# Patient Record
Sex: FEMALE | Race: BLACK OR AFRICAN AMERICAN | NOT HISPANIC OR LATINO | Employment: FULL TIME | URBAN - METROPOLITAN AREA
[De-identification: names, ages, dates, MRNs, and addresses within clinical notes are randomized per-mention and may not be internally consistent; named-entity substitution may affect disease eponyms.]

---

## 2018-09-10 ENCOUNTER — HOSPITAL ENCOUNTER (EMERGENCY)
Facility: HOSPITAL | Age: 63
Discharge: HOME | End: 2018-09-10
Attending: EMERGENCY MEDICINE
Payer: COMMERCIAL

## 2018-09-10 VITALS
WEIGHT: 185 LBS | RESPIRATION RATE: 18 BRPM | SYSTOLIC BLOOD PRESSURE: 167 MMHG | OXYGEN SATURATION: 98 % | HEART RATE: 82 BPM | HEIGHT: 66 IN | DIASTOLIC BLOOD PRESSURE: 101 MMHG | TEMPERATURE: 99 F | BODY MASS INDEX: 29.73 KG/M2

## 2018-09-10 DIAGNOSIS — R10.10 UPPER ABDOMINAL PAIN: ICD-10-CM

## 2018-09-10 DIAGNOSIS — K21.9 GASTROESOPHAGEAL REFLUX DISEASE WITHOUT ESOPHAGITIS: Primary | ICD-10-CM

## 2018-09-10 LAB
ALBUMIN SERPL BCP-MCNC: 4 G/DL
ALP SERPL-CCNC: 155 U/L
ALT SERPL W/O P-5'-P-CCNC: 13 U/L
ANION GAP SERPL CALC-SCNC: 12 MMOL/L
AST SERPL-CCNC: 14 U/L
BASOPHILS # BLD AUTO: 0.03 K/UL
BASOPHILS NFR BLD: 0.2 %
BILIRUB SERPL-MCNC: 0.3 MG/DL
BILIRUB UR QL STRIP: NEGATIVE
BUN SERPL-MCNC: 12 MG/DL
CALCIUM SERPL-MCNC: 9.9 MG/DL
CHLORIDE SERPL-SCNC: 103 MMOL/L
CLARITY UR: CLEAR
CO2 SERPL-SCNC: 24 MMOL/L
COLOR UR: YELLOW
CREAT SERPL-MCNC: 0.8 MG/DL
DIFFERENTIAL METHOD: ABNORMAL
EOSINOPHIL # BLD AUTO: 0.2 K/UL
EOSINOPHIL NFR BLD: 1.5 %
ERYTHROCYTE [DISTWIDTH] IN BLOOD BY AUTOMATED COUNT: 15.5 %
EST. GFR  (AFRICAN AMERICAN): >60 ML/MIN/1.73 M^2
EST. GFR  (NON AFRICAN AMERICAN): >60 ML/MIN/1.73 M^2
GLUCOSE SERPL-MCNC: 80 MG/DL
GLUCOSE UR QL STRIP: NEGATIVE
HCT VFR BLD AUTO: 39.3 %
HGB BLD-MCNC: 12.7 G/DL
HGB UR QL STRIP: NEGATIVE
KETONES UR QL STRIP: NEGATIVE
LEUKOCYTE ESTERASE UR QL STRIP: ABNORMAL
LIPASE SERPL-CCNC: 8 U/L
LYMPHOCYTES # BLD AUTO: 3.1 K/UL
LYMPHOCYTES NFR BLD: 24.1 %
MCH RBC QN AUTO: 24.9 PG
MCHC RBC AUTO-ENTMCNC: 32.3 G/DL
MCV RBC AUTO: 77 FL
MICROSCOPIC COMMENT: ABNORMAL
MONOCYTES # BLD AUTO: 0.7 K/UL
MONOCYTES NFR BLD: 5.5 %
NEUTROPHILS # BLD AUTO: 8.9 K/UL
NEUTROPHILS NFR BLD: 68.7 %
NITRITE UR QL STRIP: NEGATIVE
PH UR STRIP: 5 [PH] (ref 5–8)
PLATELET # BLD AUTO: 467 K/UL
PMV BLD AUTO: 10.6 FL
POTASSIUM SERPL-SCNC: 3.8 MMOL/L
PROT SERPL-MCNC: 7.9 G/DL
PROT UR QL STRIP: NEGATIVE
RBC # BLD AUTO: 5.1 M/UL
RBC #/AREA URNS HPF: 0 /HPF (ref 0–4)
SODIUM SERPL-SCNC: 139 MMOL/L
SP GR UR STRIP: 1.02 (ref 1–1.03)
SQUAMOUS #/AREA URNS HPF: 25 /HPF
URN SPEC COLLECT METH UR: ABNORMAL
UROBILINOGEN UR STRIP-ACNC: NEGATIVE EU/DL
WBC # BLD AUTO: 12.96 K/UL
WBC #/AREA URNS HPF: 2 /HPF (ref 0–5)
YEAST URNS QL MICRO: ABNORMAL

## 2018-09-10 PROCEDURE — S0028 INJECTION, FAMOTIDINE, 20 MG: HCPCS | Performed by: PHYSICIAN ASSISTANT

## 2018-09-10 PROCEDURE — 25000003 PHARM REV CODE 250: Performed by: PHYSICIAN ASSISTANT

## 2018-09-10 PROCEDURE — 96374 THER/PROPH/DIAG INJ IV PUSH: CPT

## 2018-09-10 PROCEDURE — 81000 URINALYSIS NONAUTO W/SCOPE: CPT

## 2018-09-10 PROCEDURE — 80053 COMPREHEN METABOLIC PANEL: CPT

## 2018-09-10 PROCEDURE — 83690 ASSAY OF LIPASE: CPT

## 2018-09-10 PROCEDURE — 85025 COMPLETE CBC W/AUTO DIFF WBC: CPT

## 2018-09-10 PROCEDURE — 96361 HYDRATE IV INFUSION ADD-ON: CPT

## 2018-09-10 PROCEDURE — 99284 EMERGENCY DEPT VISIT MOD MDM: CPT | Mod: 25

## 2018-09-10 RX ORDER — LISINOPRIL 10 MG/1
10 TABLET ORAL DAILY
COMMUNITY
End: 2019-03-04 | Stop reason: SDUPTHER

## 2018-09-10 RX ORDER — CYCLOBENZAPRINE HCL 10 MG
10 TABLET ORAL 3 TIMES DAILY PRN
COMMUNITY
End: 2024-02-14 | Stop reason: CLARIF

## 2018-09-10 RX ORDER — FAMOTIDINE 10 MG/ML
20 INJECTION INTRAVENOUS
Status: COMPLETED | OUTPATIENT
Start: 2018-09-10 | End: 2018-09-10

## 2018-09-10 RX ORDER — GABAPENTIN 300 MG/1
300 CAPSULE ORAL 3 TIMES DAILY
COMMUNITY
End: 2023-12-11

## 2018-09-10 RX ORDER — PANTOPRAZOLE SODIUM 20 MG/1
40 TABLET, DELAYED RELEASE ORAL
Qty: 60 TABLET | Refills: 0 | Status: SHIPPED | OUTPATIENT
Start: 2018-09-10 | End: 2019-03-04 | Stop reason: SDUPTHER

## 2018-09-10 RX ORDER — AMLODIPINE BESYLATE AND ATORVASTATIN CALCIUM 10; 20 MG/1; MG/1
1 TABLET, FILM COATED ORAL DAILY
COMMUNITY
End: 2019-03-04

## 2018-09-10 RX ORDER — IBUPROFEN 800 MG/1
800 TABLET ORAL 3 TIMES DAILY
COMMUNITY
End: 2019-03-18

## 2018-09-10 RX ORDER — ASPIRIN 81 MG/1
81 TABLET ORAL DAILY
COMMUNITY

## 2018-09-10 RX ORDER — PANTOPRAZOLE SODIUM 40 MG/1
80 TABLET, DELAYED RELEASE ORAL
Status: COMPLETED | OUTPATIENT
Start: 2018-09-10 | End: 2018-09-10

## 2018-09-10 RX ADMIN — PANTOPRAZOLE SODIUM 80 MG: 40 TABLET, DELAYED RELEASE ORAL at 11:09

## 2018-09-10 RX ADMIN — FAMOTIDINE 20 MG: 10 INJECTION, SOLUTION INTRAVENOUS at 10:09

## 2018-09-10 RX ADMIN — LIDOCAINE HYDROCHLORIDE: 20 SOLUTION ORAL; TOPICAL at 10:09

## 2018-09-10 RX ADMIN — SODIUM CHLORIDE 1000 ML: 0.9 INJECTION, SOLUTION INTRAVENOUS at 10:09

## 2018-09-10 NOTE — ED NOTES
Patient presented to the ED stating that she has stomach pains and soreness for about a week. Patient stated that she has a burning sensation to the left flank side with the pain. Patient denies any NVD or SOB. Patient stated having pains in her arms and legs since yesterday(soreness)

## 2018-09-10 NOTE — DISCHARGE INSTRUCTIONS
You have been prescribed Pantoprazole and Mylanta to take as directed for your symptoms.     Please follow-up with gastroenterology as soon as possible.    Return to the ER for any new or worsening symptoms.

## 2018-09-10 NOTE — ED PROVIDER NOTES
"Encounter Date: 9/10/2018    SCRIBE #1 NOTE: I, Brandon Gordillo, am scribing for, and in the presence of,  Paty Padilla NP. I have scribed the following portions of the note - Other sections scribed: HPI, ROS.       History     Chief Complaint   Patient presents with    Abdominal Pain     " I have soreness in my stomach and the other day the veins in my arm popped up and was hurting.     CC: Abdominal Pain    HPI: This is a 63 y.o. F who has HTN who presents to the ED for emergent evaluation of acute epigastric abdominal pain that began 1 week ago. Pt's abdominal pain worsened within the last 2 days and radiated to the periumbilical region and left lower quadrant after drinking a coke. She describes the abdominal pain as a burning sensation. Pt has a Hx of GERD and states that she attempted taking Ranitidine without relief. She states that drinking milk and apple cider vinegar helps alleviate the pain. She also took Ibuprofen for the pain without relief. No OTC treatment attempted PTA. She recently ate fried fish. She admits to not consuming an adequate amount of water daily. She denies taking blood pressure medication this morning. Pt denies nausea, vomiting, diarrhea, dysuria, foreign travel, or consumption of raw food.      The history is provided by the patient. No  was used.     Review of patient's allergies indicates:  No Known Allergies  Past Medical History:   Diagnosis Date    Hypertension      History reviewed. No pertinent surgical history.  Family History   Problem Relation Age of Onset    Cancer Neg Hx      Social History     Tobacco Use    Smoking status: Current Some Day Smoker    Smokeless tobacco: Never Used   Substance Use Topics    Alcohol use: No     Alcohol/week: 0.0 oz     Frequency: Never    Drug use: Not on file     Review of Systems   Constitutional: Negative for chills and fever.   HENT: Negative for ear pain and sore throat.    Eyes: Negative for pain. "   Respiratory: Negative for cough and shortness of breath.    Cardiovascular: Negative for chest pain.   Gastrointestinal: Positive for abdominal pain. Negative for diarrhea, nausea and vomiting.   Genitourinary: Negative for dysuria.   Musculoskeletal: Negative for back pain.   Skin: Negative for rash.   Neurological: Negative for headaches.       Physical Exam     Initial Vitals   BP Pulse Resp Temp SpO2   09/10/18 0946 09/10/18 0946 09/10/18 0946 09/10/18 0946 09/10/18 1039   (S) (!) 174/118 104 18 98.3 °F (36.8 °C) 96 %      MAP       --                Physical Exam    Nursing note and vitals reviewed.  Constitutional: Vital signs are normal. She appears well-developed and well-nourished. She is not diaphoretic. She is cooperative.  Non-toxic appearance. She does not have a sickly appearance. She does not appear ill. No distress.   HENT:   Head: Normocephalic and atraumatic.   Right Ear: Tympanic membrane, external ear and ear canal normal.   Left Ear: Tympanic membrane, external ear and ear canal normal.   Nose: Nose normal.   Mouth/Throat: Uvula is midline, oropharynx is clear and moist and mucous membranes are normal. No oropharyngeal exudate.   Eyes: Conjunctivae, EOM and lids are normal. Pupils are equal, round, and reactive to light.   Neck: Trachea normal, normal range of motion, full passive range of motion without pain and phonation normal. Neck supple.   Cardiovascular: Normal rate, regular rhythm, normal heart sounds and intact distal pulses. Exam reveals no gallop and no friction rub.    No murmur heard.  Pulmonary/Chest: Effort normal and breath sounds normal. No respiratory distress. She has no decreased breath sounds. She has no wheezes. She has no rhonchi. She has no rales.   Abdominal: Soft. Normal appearance and bowel sounds are normal. She exhibits no distension and no mass. There is tenderness in the epigastric area and periumbilical area. There is no rigidity, no rebound, no guarding and no  CVA tenderness.   Musculoskeletal: Normal range of motion.   Neurological: She is alert and oriented to person, place, and time. She has normal strength.   Skin: Skin is warm and dry. Capillary refill takes less than 2 seconds. No rash noted.   Psychiatric: She has a normal mood and affect. Her speech is normal and behavior is normal. Judgment and thought content normal. Cognition and memory are normal.         ED Course   Procedures  Labs Reviewed   CBC W/ AUTO DIFFERENTIAL - Abnormal; Notable for the following components:       Result Value    WBC 12.96 (*)     MCV 77 (*)     MCH 24.9 (*)     RDW 15.5 (*)     Platelets 467 (*)     Gran # (ANC) 8.9 (*)     All other components within normal limits   COMPREHENSIVE METABOLIC PANEL - Abnormal; Notable for the following components:    Alkaline Phosphatase 155 (*)     All other components within normal limits   URINALYSIS, REFLEX TO URINE CULTURE - Abnormal; Notable for the following components:    Leukocytes, UA Trace (*)     All other components within normal limits    Narrative:     Preferred Collection Type->Urine, Clean Catch   URINALYSIS MICROSCOPIC - Abnormal; Notable for the following components:    Yeast, UA Rare (*)     All other components within normal limits    Narrative:     Preferred Collection Type->Urine, Clean Catch   LIPASE          Imaging Results          US Abdomen Limited (Gallbladder) (Final result)  Result time 09/10/18 13:52:05    Final result by Jd Mendez MD (09/10/18 13:52:05)                 Impression:      Fatty infiltration of liver.  Hepatomegaly.  Right inferior renal pole cyst.      Electronically signed by: Jd Mendez MD  Date:    09/10/2018  Time:    13:52             Narrative:    EXAMINATION:  US ABDOMEN LIMITED    CLINICAL HISTORY:  Upper abdominal pain, unspecified    TECHNIQUE:  Limited ultrasound of the right upper quadrant of the abdomen (including pancreas, liver, gallbladder, common bile duct, and right  kidney) was performed.    COMPARISON:  None.    FINDINGS:  Liver: Enlarged in size, measuring 18 cm.  Fatty infiltration homogeneous echotexture. No focal hepatic lesions.    Gallbladder: No calculi, wall thickening, or pericholecystic fluid.  No sonographic Fernandez's sign.    Biliary system: The common duct is not dilated, measuring 5 mm.  No intrahepatic ductal dilatation.    Right kidney: Normal in size with no hydronephrosis, measuring 12.2 cm.  Lower pole cyst 0.9 cm.    Miscellaneous: No upper abdominal ascites.                                       APC / Resident Notes:   This is an evaluation of a 63 y.o. female with PMHx HTN and GERD that presents to the Emergency Department for abdominal pain.  Patient reports epigastric abdominal pain that she describes as burning that radiates down into the umbilicus.  She denies any nausea, emesis, diarrhea, constipation, urinary symptoms or further complaints. She reports attempted treatment with Ranitidine and drinking milk. She denies taking HTN medications today.    Physical Exam shows a non-toxic, afebrile, and well appearing female.  There is epigastric and periumbilical abdominal tenderness to palpation.  Bowel sounds are appreciated.  No guarding, rigidity or rebound tenderness. No CVA tenderness. Remainder of exam unremarkable.     Vital Signs Are Reassuring. If available, previous records reviewed.   RESULTS:   UA unrevealing.  CBC mild leukocytosis.  CMP unrevealing.   Lipase 8.    My overall impression is abdominal pain. I suspect PUD vs GERD.  DDx: abdominal pain, PUD, GERD, cholecystitis  I do not suspect emergent intra-abdominal process.    ED Course: IV fluid rehydration, GI cocktail, Famotidine IV. Patient reports complete resolution and pain. I feel this patient is stable for discharge. D/C Meds: Pantoprazole, Mylanta. The diagnosis, treatment plan, instructions for follow-up and reevaluation with gastroenterology, as well as ED return precautions were  discussed and understanding was verbalized. All questions or concerns have been addressed. Patient was discharged home with an instructional sheet which gave not only information regarding the most likely diagnoses but also information regarding when to return to the emergency department for alarming symptoms and when to seek further care.      This case was discussed with Dr. Castillo who is in agreement with my assessment and plan.     Ceci Woodson PA-C         Scribe Attestation:   Scribe #1: I performed the above scribed service and the documentation accurately describes the services I performed. I attest to the accuracy of the note.    Attending Attestation:           Physician Attestation for Scribe:  Physician Attestation Statement for Scribe #1: I, Ceci Woodson PA-C, reviewed documentation, as scribed by Brandon Gordillo in my presence, and it is both accurate and complete.                    Clinical Impression:   The primary encounter diagnosis was Gastroesophageal reflux disease without esophagitis. A diagnosis of Upper abdominal pain was also pertinent to this visit.      Disposition:   Disposition: Discharged  Condition: Stable                        Ceci Woodson PA-C  09/10/18 1559

## 2019-03-04 ENCOUNTER — LAB VISIT (OUTPATIENT)
Dept: LAB | Facility: HOSPITAL | Age: 64
End: 2019-03-04
Attending: FAMILY MEDICINE
Payer: COMMERCIAL

## 2019-03-04 ENCOUNTER — OFFICE VISIT (OUTPATIENT)
Dept: FAMILY MEDICINE | Facility: CLINIC | Age: 64
End: 2019-03-04
Payer: COMMERCIAL

## 2019-03-04 VITALS
DIASTOLIC BLOOD PRESSURE: 100 MMHG | HEART RATE: 96 BPM | WEIGHT: 206.88 LBS | SYSTOLIC BLOOD PRESSURE: 168 MMHG | BODY MASS INDEX: 33.25 KG/M2 | TEMPERATURE: 98 F | HEIGHT: 66 IN | OXYGEN SATURATION: 98 %

## 2019-03-04 DIAGNOSIS — Z01.419 ENCOUNTER FOR GYNECOLOGICAL EXAMINATION WITHOUT ABNORMAL FINDING: ICD-10-CM

## 2019-03-04 DIAGNOSIS — K27.9 PUD (PEPTIC ULCER DISEASE): ICD-10-CM

## 2019-03-04 DIAGNOSIS — Z00.00 ROUTINE PHYSICAL EXAMINATION: Primary | ICD-10-CM

## 2019-03-04 DIAGNOSIS — Z12.31 ENCOUNTER FOR SCREENING MAMMOGRAM FOR BREAST CANCER: ICD-10-CM

## 2019-03-04 DIAGNOSIS — Z72.0 TOBACCO ABUSE: ICD-10-CM

## 2019-03-04 DIAGNOSIS — Z23 NEED FOR PNEUMOCOCCAL VACCINATION: ICD-10-CM

## 2019-03-04 DIAGNOSIS — Z00.00 ROUTINE PHYSICAL EXAMINATION: ICD-10-CM

## 2019-03-04 DIAGNOSIS — I10 ESSENTIAL HYPERTENSION, BENIGN: ICD-10-CM

## 2019-03-04 DIAGNOSIS — E78.5 HYPERLIPIDEMIA, UNSPECIFIED HYPERLIPIDEMIA TYPE: ICD-10-CM

## 2019-03-04 DIAGNOSIS — Z12.11 COLON CANCER SCREENING: ICD-10-CM

## 2019-03-04 DIAGNOSIS — K21.9 GASTROESOPHAGEAL REFLUX DISEASE, ESOPHAGITIS PRESENCE NOT SPECIFIED: ICD-10-CM

## 2019-03-04 LAB
ALBUMIN SERPL BCP-MCNC: 3.7 G/DL
ALP SERPL-CCNC: 140 U/L
ALT SERPL W/O P-5'-P-CCNC: 11 U/L
ANION GAP SERPL CALC-SCNC: 9 MMOL/L
AST SERPL-CCNC: 11 U/L
BASOPHILS # BLD AUTO: 0.07 K/UL
BASOPHILS NFR BLD: 0.6 %
BILIRUB SERPL-MCNC: 0.3 MG/DL
BUN SERPL-MCNC: 8 MG/DL
CALCIUM SERPL-MCNC: 9.8 MG/DL
CHLORIDE SERPL-SCNC: 106 MMOL/L
CHOLEST SERPL-MCNC: 250 MG/DL
CHOLEST/HDLC SERPL: 7.1 {RATIO}
CO2 SERPL-SCNC: 27 MMOL/L
CREAT SERPL-MCNC: 0.7 MG/DL
DIFFERENTIAL METHOD: ABNORMAL
EOSINOPHIL # BLD AUTO: 0.2 K/UL
EOSINOPHIL NFR BLD: 1.8 %
ERYTHROCYTE [DISTWIDTH] IN BLOOD BY AUTOMATED COUNT: 15.5 %
EST. GFR  (AFRICAN AMERICAN): >60 ML/MIN/1.73 M^2
EST. GFR  (NON AFRICAN AMERICAN): >60 ML/MIN/1.73 M^2
ESTIMATED AVG GLUCOSE: 120 MG/DL
GLUCOSE SERPL-MCNC: 85 MG/DL
HAV IGM SERPL QL IA: NEGATIVE
HBA1C MFR BLD HPLC: 5.8 %
HBV CORE IGM SERPL QL IA: NEGATIVE
HBV SURFACE AG SERPL QL IA: NEGATIVE
HCT VFR BLD AUTO: 41.4 %
HCV AB SERPL QL IA: NEGATIVE
HDLC SERPL-MCNC: 35 MG/DL
HDLC SERPL: 14 %
HGB BLD-MCNC: 12.7 G/DL
HIV 1+2 AB+HIV1 P24 AG SERPL QL IA: NEGATIVE
IMM GRANULOCYTES # BLD AUTO: 0.04 K/UL
IMM GRANULOCYTES NFR BLD AUTO: 0.3 %
LDLC SERPL CALC-MCNC: 182.6 MG/DL
LYMPHOCYTES # BLD AUTO: 2.8 K/UL
LYMPHOCYTES NFR BLD: 22.6 %
MCH RBC QN AUTO: 24.8 PG
MCHC RBC AUTO-ENTMCNC: 30.7 G/DL
MCV RBC AUTO: 81 FL
MONOCYTES # BLD AUTO: 0.7 K/UL
MONOCYTES NFR BLD: 5.7 %
NEUTROPHILS # BLD AUTO: 8.7 K/UL
NEUTROPHILS NFR BLD: 69 %
NONHDLC SERPL-MCNC: 215 MG/DL
NRBC BLD-RTO: 0 /100 WBC
PLATELET # BLD AUTO: 475 K/UL
PMV BLD AUTO: 10.7 FL
POTASSIUM SERPL-SCNC: 3.4 MMOL/L
PROT SERPL-MCNC: 7.4 G/DL
RBC # BLD AUTO: 5.13 M/UL
SODIUM SERPL-SCNC: 142 MMOL/L
T4 FREE SERPL-MCNC: 1.06 NG/DL
TRIGL SERPL-MCNC: 162 MG/DL
TSH SERPL DL<=0.005 MIU/L-ACNC: 0.38 UIU/ML
WBC # BLD AUTO: 12.53 K/UL

## 2019-03-04 PROCEDURE — 84443 ASSAY THYROID STIM HORMONE: CPT

## 2019-03-04 PROCEDURE — 80053 COMPREHEN METABOLIC PANEL: CPT

## 2019-03-04 PROCEDURE — 90732 PPSV23 VACC 2 YRS+ SUBQ/IM: CPT | Mod: S$GLB,,, | Performed by: FAMILY MEDICINE

## 2019-03-04 PROCEDURE — 99999 PR PBB SHADOW E&M-EST. PATIENT-LVL IV: CPT | Mod: PBBFAC,,, | Performed by: FAMILY MEDICINE

## 2019-03-04 PROCEDURE — 83036 HEMOGLOBIN GLYCOSYLATED A1C: CPT

## 2019-03-04 PROCEDURE — 99396 PR PREVENTIVE VISIT,EST,40-64: ICD-10-PCS | Mod: 25,S$GLB,, | Performed by: FAMILY MEDICINE

## 2019-03-04 PROCEDURE — 80074 ACUTE HEPATITIS PANEL: CPT

## 2019-03-04 PROCEDURE — 90471 PNEUMOCOCCAL POLYSACCHARIDE VACCINE 23-VALENT =>2YO SQ IM: ICD-10-PCS | Mod: S$GLB,,, | Performed by: FAMILY MEDICINE

## 2019-03-04 PROCEDURE — 86592 SYPHILIS TEST NON-TREP QUAL: CPT

## 2019-03-04 PROCEDURE — 99999 PR PBB SHADOW E&M-EST. PATIENT-LVL IV: ICD-10-PCS | Mod: PBBFAC,,, | Performed by: FAMILY MEDICINE

## 2019-03-04 PROCEDURE — 3077F SYST BP >= 140 MM HG: CPT | Mod: CPTII,S$GLB,, | Performed by: FAMILY MEDICINE

## 2019-03-04 PROCEDURE — 86703 HIV-1/HIV-2 1 RESULT ANTBDY: CPT

## 2019-03-04 PROCEDURE — 3077F PR MOST RECENT SYSTOLIC BLOOD PRESSURE >= 140 MM HG: ICD-10-PCS | Mod: CPTII,S$GLB,, | Performed by: FAMILY MEDICINE

## 2019-03-04 PROCEDURE — 85025 COMPLETE CBC W/AUTO DIFF WBC: CPT

## 2019-03-04 PROCEDURE — 3080F PR MOST RECENT DIASTOLIC BLOOD PRESSURE >= 90 MM HG: ICD-10-PCS | Mod: CPTII,S$GLB,, | Performed by: FAMILY MEDICINE

## 2019-03-04 PROCEDURE — 90471 IMMUNIZATION ADMIN: CPT | Mod: S$GLB,,, | Performed by: FAMILY MEDICINE

## 2019-03-04 PROCEDURE — 3080F DIAST BP >= 90 MM HG: CPT | Mod: CPTII,S$GLB,, | Performed by: FAMILY MEDICINE

## 2019-03-04 PROCEDURE — 90732 PNEUMOCOCCAL POLYSACCHARIDE VACCINE 23-VALENT =>2YO SQ IM: ICD-10-PCS | Mod: S$GLB,,, | Performed by: FAMILY MEDICINE

## 2019-03-04 PROCEDURE — 36415 COLL VENOUS BLD VENIPUNCTURE: CPT | Mod: PO

## 2019-03-04 PROCEDURE — 84439 ASSAY OF FREE THYROXINE: CPT

## 2019-03-04 PROCEDURE — 80061 LIPID PANEL: CPT

## 2019-03-04 PROCEDURE — 99396 PREV VISIT EST AGE 40-64: CPT | Mod: 25,S$GLB,, | Performed by: FAMILY MEDICINE

## 2019-03-04 RX ORDER — PANTOPRAZOLE SODIUM 40 MG/1
40 TABLET, DELAYED RELEASE ORAL DAILY
Qty: 90 TABLET | Refills: 3 | Status: SHIPPED | OUTPATIENT
Start: 2019-03-04 | End: 2020-04-30 | Stop reason: SDUPTHER

## 2019-03-04 RX ORDER — LISINOPRIL 10 MG/1
10 TABLET ORAL DAILY
Qty: 30 TABLET | Refills: 3 | Status: SHIPPED | OUTPATIENT
Start: 2019-03-04 | End: 2019-11-10 | Stop reason: SDUPTHER

## 2019-03-04 RX ORDER — AMLODIPINE BESYLATE 10 MG/1
TABLET ORAL
Refills: 0 | COMMUNITY
Start: 2018-11-28 | End: 2023-12-11 | Stop reason: SDUPTHER

## 2019-03-04 RX ORDER — ATORVASTATIN CALCIUM 20 MG/1
20 TABLET, FILM COATED ORAL DAILY
Qty: 90 TABLET | Refills: 3 | Status: SHIPPED | OUTPATIENT
Start: 2019-03-04 | End: 2020-04-30 | Stop reason: SDUPTHER

## 2019-03-04 NOTE — PROGRESS NOTES
[facility]  Progress Note    SUBJECTIVE:     Chief Complaint   Patient presents with    Annual Exam       HPI   Greer Valentin  is a 63 y.o. female here for routine physical exam. Has occasional abdominal pain, was dx with PUD in past due to ibuprofen use. No current pain. Patient has no other new complaints/problems at this time.      Review of patient's allergies indicates:  No Known Allergies    Past Medical History:   Diagnosis Date    Hypertension      No past surgical history on file.  Family History   Problem Relation Age of Onset    Cancer Neg Hx      Social History     Tobacco Use    Smoking status: Current Some Day Smoker    Smokeless tobacco: Never Used   Substance Use Topics    Alcohol use: No     Alcohol/week: 0.0 oz     Frequency: Never    Drug use: Not on file        Review of Systems   Constitutional: Negative for chills, diaphoresis and fever.   HENT: Negative for congestion, ear pain and sore throat.    Eyes: Negative for photophobia and discharge.   Respiratory: Negative for cough, shortness of breath and wheezing.    Cardiovascular: Negative for chest pain and palpitations.   Gastrointestinal: Negative for abdominal pain, constipation, diarrhea, nausea and vomiting.   Genitourinary: Negative for dysuria and hematuria.   Musculoskeletal: Negative for back pain and myalgias.   Skin: Negative for itching and rash.   Neurological: Negative for dizziness, sensory change, focal weakness, weakness and headaches.   All other systems reviewed and are negative.    OBJECTIVE:     Vitals:    03/04/19 0858   BP: (!) 168/100   Pulse: 96   Temp: 98.4 °F (36.9 °C)     Body mass index is 33.39 kg/m².    Physical Exam   Constitutional: She is oriented to person, place, and time and well-developed, well-nourished, and in no distress. No distress.   HENT:   Head: Normocephalic and atraumatic.   Right Ear: Tympanic membrane is not perforated, not erythematous and not bulging. No hemotympanum.   Left  Ear: Tympanic membrane is not perforated, not erythematous and not bulging. No hemotympanum.   Mouth/Throat: Oropharynx is clear and moist. No oropharyngeal exudate.   Eyes: Conjunctivae and EOM are normal. Pupils are equal, round, and reactive to light.   Neck: No thyromegaly present.   Cardiovascular: Normal rate, regular rhythm and normal heart sounds. Exam reveals no gallop and no friction rub.   No murmur heard.  Pulmonary/Chest: Effort normal and breath sounds normal. No respiratory distress. She has no wheezes. She has no rales.   Abdominal: Soft. Bowel sounds are normal. She exhibits no distension. There is no tenderness. There is no rebound and no guarding.   Musculoskeletal: Normal range of motion. She exhibits no edema or tenderness.   Lymphadenopathy:     She has no cervical adenopathy.   Neurological: She is alert and oriented to person, place, and time.   Skin: Skin is warm. No rash noted. She is not diaphoretic. No erythema.       Old records were reviewed. Labs and/or images were independently reviewed.    ASSESSMENT     1. Routine physical examination    2. Colon cancer screening    3. Need for pneumococcal vaccination    4. Hyperlipidemia, unspecified hyperlipidemia type    5. Essential hypertension, benign    6. Gastroesophageal reflux disease, esophagitis presence not specified    7. Tobacco abuse    8. Encounter for screening mammogram for breast cancer    9. Encounter for gynecological examination without abnormal finding    10. PUD (peptic ulcer disease)        PLAN:     Routine physical examination  -     CBC auto differential; Future  -     Comprehensive metabolic panel; Future  -     Hemoglobin A1c; Future  -     Lipid panel; Future  -     TSH; Future  -     T4, free; Future  -     Hepatitis panel, acute; Future; Expected date: 03/04/2019  -     HIV 1/2 Ag/Ab (4th Gen); Future; Expected date: 03/04/2019  -     RPR; Future; Expected date: 03/04/2019  -     We briefly discussed diet,  exercise, and routine preventive exams. All questions and comments addressed.    Colon cancer screening  -     Case request GI: COLONOSCOPY    Need for pneumococcal vaccination  -     Pneumococcal Polysaccharide Vaccine (23 Valent) (SQ/IM)    Hyperlipidemia, unspecified hyperlipidemia type  -     atorvastatin (LIPITOR) 20 MG tablet; Take 1 tablet (20 mg total) by mouth once daily.  Dispense: 90 tablet; Refill: 3  -     Stable. Continue current regimen.    Essential hypertension, benign  -     lisinopril 10 MG tablet; Take 1 tablet (10 mg total) by mouth once daily.  Dispense: 30 tablet; Refill: 3  -     Educated patient to take medications as prescribed, and to record bp logs daily to bring along to next visit. Instructed patient to decrease salt intake. Also told patient to call if any new signs or symptoms develop.  -     Hasn't been taking meds.    Tobacco abuse   -     Not ready to quit at this time.    Encounter for screening mammogram for breast cancer  -     Mammo Digital Screening Bilat; Future; Expected date: 03/04/2019    Encounter for gynecological examination without abnormal finding  -     Ambulatory referral to Obstetrics / Gynecology    PUD (peptic ulcer disease)  -     pantoprazole (PROTONIX) 40 MG tablet; Take 1 tablet (40 mg total) by mouth once daily.  Dispense: 90 tablet; Refill: 3  -     Stable. Continue current regimen.    RTC ALONDRA Padgett MD  03/04/2019 9:23 AM

## 2019-03-06 LAB — RPR SER QL: NORMAL

## 2019-03-07 DIAGNOSIS — Z12.11 COLON CANCER SCREENING: Primary | ICD-10-CM

## 2019-03-11 ENCOUNTER — CLINICAL SUPPORT (OUTPATIENT)
Dept: FAMILY MEDICINE | Facility: CLINIC | Age: 64
End: 2019-03-11
Payer: COMMERCIAL

## 2019-03-11 VITALS — SYSTOLIC BLOOD PRESSURE: 128 MMHG | OXYGEN SATURATION: 97 % | HEART RATE: 99 BPM | DIASTOLIC BLOOD PRESSURE: 80 MMHG

## 2019-03-11 DIAGNOSIS — I10 ESSENTIAL HYPERTENSION, BENIGN: Primary | ICD-10-CM

## 2019-03-11 PROCEDURE — 99999 PR PBB SHADOW E&M-EST. PATIENT-LVL I: ICD-10-PCS | Mod: PBBFAC,,,

## 2019-03-11 PROCEDURE — 99499 UNLISTED E&M SERVICE: CPT | Mod: S$GLB,,, | Performed by: FAMILY MEDICINE

## 2019-03-11 PROCEDURE — 99499 NO LOS: ICD-10-PCS | Mod: S$GLB,,, | Performed by: FAMILY MEDICINE

## 2019-03-11 PROCEDURE — 99999 PR PBB SHADOW E&M-EST. PATIENT-LVL I: CPT | Mod: PBBFAC,,,

## 2019-03-11 NOTE — PROGRESS NOTES
Greer Valentin 63 y.o. female is here today for Blood Pressure check.   History of HTN yes.    Review of patient's allergies indicates:  No Known Allergies  Creatinine   Date Value Ref Range Status   03/04/2019 0.7 0.5 - 1.4 mg/dL Final     Sodium   Date Value Ref Range Status   03/04/2019 142 136 - 145 mmol/L Final     Potassium   Date Value Ref Range Status   03/04/2019 3.4 (L) 3.5 - 5.1 mmol/L Final   ]  Patient verifies taking blood pressure medications on a regular basis at the same time of the day.     Current Outpatient Medications:     amLODIPine (NORVASC) 10 MG tablet, , Disp: , Rfl: 0    aspirin (ECOTRIN) 81 MG EC tablet, Take 81 mg by mouth once daily., Disp: , Rfl:     atorvastatin (LIPITOR) 20 MG tablet, Take 1 tablet (20 mg total) by mouth once daily., Disp: 90 tablet, Rfl: 3    cyclobenzaprine (FLEXERIL) 10 MG tablet, Take 10 mg by mouth 3 (three) times daily as needed for Muscle spasms., Disp: , Rfl:     gabapentin (NEURONTIN) 300 MG capsule, Take 300 mg by mouth 3 (three) times daily., Disp: , Rfl:     ibuprofen (ADVIL,MOTRIN) 800 MG tablet, Take 800 mg by mouth 3 (three) times daily., Disp: , Rfl:     lisinopril 10 MG tablet, Take 1 tablet (10 mg total) by mouth once daily., Disp: 30 tablet, Rfl: 3    nystatin (DUKE'S SOLUTION), Take 10 mLs by mouth every 4 (four) hours as needed (abdominal pain/burning sensation)., Disp: 120 mL, Rfl: 0    pantoprazole (PROTONIX) 40 MG tablet, Take 1 tablet (40 mg total) by mouth once daily., Disp: 90 tablet, Rfl: 3    UNKNOWN TO PATIENT, , Disp: , Rfl:   Does patient have record of home blood pressure readings no.   Last dose of blood pressure medication was taken at 3/11/2019 8AM .  Patient is asymptomatic.   Complains of none.    Vitals:    03/11/19 0835   BP: 128/80   Pulse: 99   SpO2: 97%         Dr. Daniel Padgett informed of nurse visit.

## 2019-03-13 ENCOUNTER — HOSPITAL ENCOUNTER (OUTPATIENT)
Dept: RADIOLOGY | Facility: HOSPITAL | Age: 64
Discharge: HOME | End: 2019-03-13
Attending: FAMILY MEDICINE
Payer: COMMERCIAL

## 2019-03-13 DIAGNOSIS — Z12.31 ENCOUNTER FOR SCREENING MAMMOGRAM FOR BREAST CANCER: ICD-10-CM

## 2019-03-13 PROCEDURE — 77063 MAMMO DIGITAL SCREENING BILAT WITH TOMOSYNTHESIS_CAD: ICD-10-PCS | Mod: 26,,, | Performed by: RADIOLOGY

## 2019-03-13 PROCEDURE — 77063 BREAST TOMOSYNTHESIS BI: CPT | Mod: 26,,, | Performed by: RADIOLOGY

## 2019-03-13 PROCEDURE — 77067 SCR MAMMO BI INCL CAD: CPT | Mod: 26,,, | Performed by: RADIOLOGY

## 2019-03-13 PROCEDURE — 77067 MAMMO DIGITAL SCREENING BILAT WITH TOMOSYNTHESIS_CAD: ICD-10-PCS | Mod: 26,,, | Performed by: RADIOLOGY

## 2019-03-13 PROCEDURE — 77067 SCR MAMMO BI INCL CAD: CPT | Mod: TC,PO

## 2019-03-18 ENCOUNTER — OFFICE VISIT (OUTPATIENT)
Dept: OBSTETRICS AND GYNECOLOGY | Facility: CLINIC | Age: 64
End: 2019-03-18
Payer: COMMERCIAL

## 2019-03-18 VITALS
HEIGHT: 66 IN | SYSTOLIC BLOOD PRESSURE: 132 MMHG | BODY MASS INDEX: 32.54 KG/M2 | WEIGHT: 202.5 LBS | DIASTOLIC BLOOD PRESSURE: 86 MMHG

## 2019-03-18 DIAGNOSIS — Z01.419 WELL WOMAN EXAM WITH ROUTINE GYNECOLOGICAL EXAM: Primary | ICD-10-CM

## 2019-03-18 PROCEDURE — 87624 HPV HI-RISK TYP POOLED RSLT: CPT

## 2019-03-18 PROCEDURE — 99386 PR PREVENTIVE VISIT,NEW,40-64: ICD-10-PCS | Mod: S$GLB,,, | Performed by: OBSTETRICS & GYNECOLOGY

## 2019-03-18 PROCEDURE — 99999 PR PBB SHADOW E&M-EST. PATIENT-LVL III: ICD-10-PCS | Mod: PBBFAC,,, | Performed by: OBSTETRICS & GYNECOLOGY

## 2019-03-18 PROCEDURE — 3075F PR MOST RECENT SYSTOLIC BLOOD PRESS GE 130-139MM HG: ICD-10-PCS | Mod: CPTII,S$GLB,, | Performed by: OBSTETRICS & GYNECOLOGY

## 2019-03-18 PROCEDURE — 88175 CYTOPATH C/V AUTO FLUID REDO: CPT

## 2019-03-18 PROCEDURE — 99386 PREV VISIT NEW AGE 40-64: CPT | Mod: S$GLB,,, | Performed by: OBSTETRICS & GYNECOLOGY

## 2019-03-18 PROCEDURE — 99999 PR PBB SHADOW E&M-EST. PATIENT-LVL III: CPT | Mod: PBBFAC,,, | Performed by: OBSTETRICS & GYNECOLOGY

## 2019-03-18 PROCEDURE — 3079F DIAST BP 80-89 MM HG: CPT | Mod: CPTII,S$GLB,, | Performed by: OBSTETRICS & GYNECOLOGY

## 2019-03-18 PROCEDURE — 3075F SYST BP GE 130 - 139MM HG: CPT | Mod: CPTII,S$GLB,, | Performed by: OBSTETRICS & GYNECOLOGY

## 2019-03-18 PROCEDURE — 3079F PR MOST RECENT DIASTOLIC BLOOD PRESSURE 80-89 MM HG: ICD-10-PCS | Mod: CPTII,S$GLB,, | Performed by: OBSTETRICS & GYNECOLOGY

## 2019-03-18 NOTE — PATIENT INSTRUCTIONS
Prevention Guidelines, Women Ages 50 to 64  Screening tests and vaccines are an important part of managing your health. Health counseling is essential, too. Below are guidelines for these, for women ages 50 to 64. Talk with your healthcare provider to make sure youre up to date on what you need.  Screening Who needs it How often   Type 2 diabetes or prediabetes All adults beginning at age 45 and adults without symptoms at any age who are overweight or obese and have 1 or more additional risk factors for diabetes. At  least every 3 years   Alcohol misuse All women in this age group At routine exams   Blood pressure All women in this age group Every 2 years if your blood pressure is less than 120/80 mm Hg; yearly if your systolic blood pressure is 120 to 139 mm Hg, or your diastolic blood pressure reading is 80 to 89 mm Hg   Breast cancer All women in this age group Yearly mammogram and clinical breast exam1   Cervical cancer All women in this age group, except women who have had a complete hysterectomy Pap test every 3 years or Pap test with human papillomavirus (HPV) test every 5 years   Chlamydia Women at increased risk for infection At routine exams   Colorectal cancer All women in this age group Flexible sigmoidoscopy every 5 years, or colonoscopy every 10 years, or double-contrast barium enema every 5 years; yearly fecal occult blood test or fecal immunochemical test; or a stool DNA test as often as your health care provider advises; talk with your health care provider about which tests are best for you   Depression All women in this age group At routine exams   Gonorrhea Sexually active women at increased risk for infection At routine exams   Hepatitis C Anyone at increased risk; 1 time for those born between 1945 and 1965 At routine exams   High cholesterol or triglycerides All women in this age group who are at risk for coronary artery disease At least every 5 years   HIV All women At routine exams   Lung  cancer Adults age 55 to 80 who have smoked Yearly screening in smokers with 30 pack-year history of smoking or who quit within 15 years   Obesity All women in this age group At routine exams   Osteoporosis Women who are postmenopausal Ask your healthcare provider   Syphilis Women at increased risk for infection - talk with your healthcare provider At routine exams   Tuberculosis Women at increased risk for infection - talk with your healthcare provider Ask your healthcare provider   Vision All women in this age group Ask your healthcare provider   Vaccine Who needs it How often   Chickenpox (varicella) All women in this age group who have no record of this infection or vaccine 2 doses; the second dose should be given at least 4 weeks after the first dose   Hepatitis A Women at increased risk for infection - talk with your healthcare provider 2 doses given at least 6 months apart   Hepatitis B Women at increased risk for infection - talk with your healthcare provider 3 doses over 6 months; second dose should be given 1 month after the first dose; the third dose should be given at least 2 months after the second dose and at least 4 months after the first dose   Haemophilus influenzaeType B (HIB) Women at increased risk for infection - talk with your healthcare provider 1 to 3 doses   Influenza (flu) All women in this age group Once a year   Measles, mumps, rubella (MMR) Women in this age group through their late 50s who have no record of these infections or vaccines 1 dose   Meningococcal Women at increased risk for infection - talk with your healthcare provider 1 or more doses   Pneumococcal conjugate vaccine (PCV13) and pneumococcal polysaccharide vaccine (PPSV23) Women at increased risk for infection - talk with your healthcare provider PCV13: 1 dose ages 19 to 65 (protects against 13 types of pneumococcal bacteria)  PPSV23: 1 to 2 doses through age 64, or 1 dose at 65 or older (protects against 23 types of  pneumococcal bacteria)   Tetanus/diphtheria/pertussis (Td/Tdap) booster All women in this age group Td every 10 years, or a one-time dose of Tdap instead of a Td booster after age 18, then Td every 10 years   Zoster All women ages 60 and older 1 dose   Counseling Who needs it How often   BRCA gene mutation testing for breast and ovarian cancer susceptibility Women with increased risk for having gene mutation When your risk is known   Breast cancer and chemoprevention Women at high risk for breast cancer When your risk is known   Diet and exercise Women who are overweight or obese When diagnosed, and then at routine exams   Sexually transmitted infection prevention Women at increased risk for infection - talk with your healthcare provider At routine exams   Use of daily aspirin Women ages 55 and up in this age group who are at risk for cardiovascular health problems such as stroke When your risk is known   Use of tobacco and the health effects it can cause All women in this age group Every exam   1American [employer] Society  Date Last Reviewed: 1/26/2016  © 3585-4341 [employer]. [address] [place] [address], [place] [address]. All rights reserved. This information is not intended as a substitute for professional medical care. Always follow your healthcare professional's instructions.

## 2019-03-18 NOTE — LETTER
March 18, 2019      Daniel Padgett MD  [address] [facility] [place] [address]           [facility] - OB/ GYN  [address] [facility] [address]  [place] [address]  Phone: [phone]          Patient: Greer Valentin   MR Number: [id number]   YOB: 1955   Date of Visit: 3/18/2019       Dear Dr. Daniel Padgett:    Thank you for referring Greer Valentin to me for evaluation. Attached you will find relevant portions of my assessment and plan of care.    If you have questions, please do not hesitate to call me. I look forward to following Greer Valentin along with you.    Sincerely,    Gali Hennessy MD    Enclosure  CC:  No Recipients    If you would like to receive this communication electronically, please contact [email or web address] or [phone] to request more information on [employer] access.    For providers and/or their staff who would like to refer a patient to [facility], please contact us through our one-stop-shop provider referral line, [facility], at [phone].    If you feel you have received this communication in error or would no longer like to receive these types of communications, please e-mail [email or web address]

## 2019-03-18 NOTE — PROGRESS NOTES
History & Physical  Gynecology      SUBJECTIVE:     Chief Complaint: Well Woman       History of Present Illness:  Annual Exam-Postmenopausal  Ms. Valentin is a 64 y/o female who presents for annual exam. The patient has no complaints today. The patient is not sexually active. GYN screening history: last pap: approximate date  and was normal and last mammogram: approximate date 2019 and was normal. The patient is not taking hormone replacement therapy. Patient denies post-menopausal vaginal bleeding. The patient wears seatbelts: yes. The patient participates in regular exercise: no. Has the patient ever been transfused or tattooed?: no. The patient reports that there is not domestic violence in her life.    Review of patient's allergies indicates:  No Known Allergies    Past Medical History:   Diagnosis Date    Hypertension      History reviewed. No pertinent surgical history.  OB History      Para Term  AB Living    5 5 5     5    SAB TAB Ectopic Multiple Live Births                     Family History   Problem Relation Age of Onset    Cancer Neg Hx      Social History     Tobacco Use    Smoking status: Current Some Day Smoker    Smokeless tobacco: Never Used   Substance Use Topics    Alcohol use: No     Alcohol/week: 0.0 oz     Frequency: Never    Drug use: No       Current Outpatient Medications   Medication Sig    amLODIPine (NORVASC) 10 MG tablet     aspirin (ECOTRIN) 81 MG EC tablet Take 81 mg by mouth once daily.    atorvastatin (LIPITOR) 20 MG tablet Take 1 tablet (20 mg total) by mouth once daily.    cyclobenzaprine (FLEXERIL) 10 MG tablet Take 10 mg by mouth 3 (three) times daily as needed for Muscle spasms.    gabapentin (NEURONTIN) 300 MG capsule Take 300 mg by mouth 3 (three) times daily.    lisinopril 10 MG tablet Take 1 tablet (10 mg total) by mouth once daily.    nystatin (DUKE'S SOLUTION) Take 10 mLs by mouth every 4 (four) hours as needed (abdominal pain/burning  sensation).    pantoprazole (PROTONIX) 40 MG tablet Take 1 tablet (40 mg total) by mouth once daily.    polyethylene glycol (COLYTE) 240-22.72-6.72 -5.84 gram SolR     UNKNOWN TO PATIENT      No current facility-administered medications for this visit.        Review of Systems:  Review of Systems   Constitutional: Negative for chills and fever.   Eyes: Negative for visual disturbance.   Respiratory: Negative for cough and wheezing.    Cardiovascular: Negative for chest pain and palpitations.   Gastrointestinal: Negative for abdominal pain, nausea and vomiting.   Genitourinary: Negative for dysuria, frequency, hematuria, pelvic pain, vaginal bleeding, vaginal discharge and vaginal pain.   Neurological: Negative for headaches.        OBJECTIVE:     Physical Exam:  Physical Exam   Constitutional: She is oriented to person, place, and time. She appears well-developed and well-nourished.   Cardiovascular: Normal rate and normal heart sounds.   Pulmonary/Chest: Effort normal. No respiratory distress. Breasts are symmetrical. There is no breast swelling.   Abdominal: Soft. She exhibits no distension. There is no tenderness.   Genitourinary: Rectum normal. No breast tenderness, discharge or bleeding. No bleeding in the vagina. No vaginal discharge found.   Genitourinary Comments: Uterus approximately 9-10 week size. Cervix posterior and atrophic. Vaginal walls redundant    Neurological: She is oriented to person, place, and time.   Skin: Skin is warm and dry.   Psychiatric: She has a normal mood and affect.   Nursing note and vitals reviewed.    ASSESSMENT:       ICD-10-CM ICD-9-CM    1. Well woman exam with routine gynecological exam Z01.419 V72.31 Liquid-based pap smear, screening      HPV High Risk Genotypes, PCR     Plan:      Greer was seen today for well woman.    Diagnoses and all orders for this visit:    Well woman exam with routine gynecological exam  -     Liquid-based pap smear, screening  -     HPV High Risk  Genotypes, PCR  - Colonoscopy scheduled 05/2019  - Mammogram done 03/13/2019  - DXA is not due until 2020    Orders Placed This Encounter   Procedures    HPV High Risk Genotypes, PCR       Follow-up in about 1 year (around 3/18/2020) for wwe.     Counseling time: 15 minutes    Gali Hennessy

## 2019-03-20 LAB
HPV HR 12 DNA CVX QL NAA+PROBE: NEGATIVE
HPV16 AG SPEC QL: NEGATIVE
HPV18 DNA SPEC QL NAA+PROBE: NEGATIVE

## 2019-07-02 PROBLEM — Z00.00 ENCOUNTER FOR PREVENTIVE HEALTH EXAMINATION: Status: ACTIVE | Noted: 2019-07-02

## 2019-07-25 ENCOUNTER — APPOINTMENT (OUTPATIENT)
Dept: OTOLARYNGOLOGY | Facility: CLINIC | Age: 64
End: 2019-07-25
Payer: COMMERCIAL

## 2019-07-25 VITALS
DIASTOLIC BLOOD PRESSURE: 90 MMHG | WEIGHT: 138.8 LBS | HEART RATE: 73 BPM | HEIGHT: 63.5 IN | BODY MASS INDEX: 24.29 KG/M2 | SYSTOLIC BLOOD PRESSURE: 122 MMHG

## 2019-07-25 DIAGNOSIS — Z82.49 FAMILY HISTORY OF ISCHEMIC HEART DISEASE AND OTHER DISEASES OF THE CIRCULATORY SYSTEM: ICD-10-CM

## 2019-07-25 DIAGNOSIS — Z86.2 PERSONAL HISTORY OF DISEASES OF THE BLOOD AND BLOOD-FORMING ORGANS AND CERTAIN DISORDERS INVOLVING THE IMMUNE MECHANISM: ICD-10-CM

## 2019-07-25 DIAGNOSIS — Z80.9 FAMILY HISTORY OF MALIGNANT NEOPLASM, UNSPECIFIED: ICD-10-CM

## 2019-07-25 DIAGNOSIS — Z78.9 OTHER SPECIFIED HEALTH STATUS: ICD-10-CM

## 2019-07-25 DIAGNOSIS — Z86.79 PERSONAL HISTORY OF OTHER DISEASES OF THE CIRCULATORY SYSTEM: ICD-10-CM

## 2019-07-25 DIAGNOSIS — Z86.39 PERSONAL HISTORY OF OTHER ENDOCRINE, NUTRITIONAL AND METABOLIC DISEASE: ICD-10-CM

## 2019-07-25 DIAGNOSIS — Z87.2 PERSONAL HISTORY OF DISEASES OF THE SKIN AND SUBCUTANEOUS TISSUE: ICD-10-CM

## 2019-07-25 PROCEDURE — 31231 NASAL ENDOSCOPY DX: CPT

## 2019-07-25 PROCEDURE — 92550 TYMPANOMETRY & REFLEX THRESH: CPT

## 2019-07-25 PROCEDURE — 92557 COMPREHENSIVE HEARING TEST: CPT

## 2019-07-25 PROCEDURE — 99204 OFFICE O/P NEW MOD 45 MIN: CPT | Mod: 25

## 2019-07-25 RX ORDER — BUDESONIDE 32 UG/1
32 SPRAY, METERED NASAL DAILY
Qty: 1 | Refills: 5 | Status: ACTIVE | COMMUNITY
Start: 2019-07-25 | End: 1900-01-01

## 2019-07-25 RX ORDER — CHROMIUM 200 MCG
TABLET ORAL
Refills: 0 | Status: ACTIVE | COMMUNITY

## 2019-07-25 RX ORDER — ASCORBIC ACID 500 MG
TABLET ORAL
Refills: 0 | Status: ACTIVE | COMMUNITY

## 2019-07-25 RX ORDER — QUINAPRIL HYDROCHLORIDE 40 MG/1
40 TABLET, FILM COATED ORAL
Refills: 0 | Status: ACTIVE | COMMUNITY

## 2019-07-25 RX ORDER — INDAPAMIDE 1.25 MG/1
1.25 TABLET, FILM COATED ORAL
Refills: 0 | Status: ACTIVE | COMMUNITY

## 2019-07-25 RX ORDER — AMLODIPINE BESYLATE 5 MG/1
5 TABLET ORAL
Refills: 0 | Status: ACTIVE | COMMUNITY

## 2019-07-25 RX ORDER — METHYLPREDNISOLONE 4 MG/1
4 TABLET ORAL
Qty: 1 | Refills: 2 | Status: ACTIVE | COMMUNITY
Start: 2019-07-25 | End: 1900-01-01

## 2019-07-25 RX ORDER — MULTIVIT-MIN/FA/LYCOPEN/LUTEIN .4-300-25
TABLET ORAL
Refills: 0 | Status: ACTIVE | COMMUNITY

## 2019-07-25 RX ORDER — LEVOTHYROXINE SODIUM 100 UG/1
100 TABLET ORAL
Refills: 0 | Status: ACTIVE | COMMUNITY

## 2019-07-25 RX ORDER — VALACYCLOVIR 1 G/1
1 TABLET, FILM COATED ORAL
Refills: 0 | Status: ACTIVE | COMMUNITY

## 2019-07-25 RX ORDER — MILK THISTLE FRUIT EXTRACT 140 MG
CAPSULE ORAL
Refills: 0 | Status: ACTIVE | COMMUNITY

## 2019-07-25 RX ORDER — B-COMPLEX WITH VITAMIN C
TABLET ORAL
Refills: 0 | Status: ACTIVE | COMMUNITY

## 2019-07-25 NOTE — HISTORY OF PRESENT ILLNESS
[de-identified] : 63F here for initial evaluation.\par \par For the past 6-8 weeks or so, she c/o right ear fullness ("something wrong with my right ear") in addition to fullness/pressure over her right cheek. These sx started when she was around various animals to which she may be allergic. Afterwards, she took allergy meds, but then developed a 'sinus infection' which has since resolved, but she continues to c/o the above sx. There is no otorrhea, tinnitus or vertigo. The left ear feels fine. There is no nasal obstruction/congestion, no green drainage and olfaction is strong.\par She saw PCP who started on flonase without much help.\par \par She has known allergies - mold, dander, pollen - just takes meds as needed.\par She also has h/o herpes/shingles, and takes daily valcyclovir.\par \par Audiogram from today:\par hearing symmetric wnl. R SRT 15, 100% discrim, L SRT 10, 90% discrim, type A tymps\par \par ROS otherwise unremarkable.

## 2019-07-25 NOTE — ASSESSMENT
[FreeTextEntry1] : 63F here for initial evaluation. For the past 6-8 weeks or so, she c/o right ear fullness (she says "something is wrong with my right ear") in addition to fullness/pressure over her right cheek. These sx started when she was around various animals to which she may be allergic. Afterwards, she took allergy medications, but then developed a 'sinus infection' which has since resolved, but she continues to c/o the above sx. There is no otorrhea, tinnitus or vertigo. The left ear feels fine. She saw PCP who started on flonase without much help.\par She has known allergies - mold, dander, pollen - just takes meds as needed. She also has h/o herpes/shingles, and takes daily valcyclovir. Audiogram from today is completely normal. On exam, there is mild right midface and supraorbital pressure. Otologic exam is normal. Nasal endoscopy shows hypertrophic inferior turbinates and moderately severe right mid/superior septal deviation w OMC narrowing, but no mucopus or polyps. The rest of the head and neck exam is unremarkable.\par I would favor her sx to be postinfectious/inflammatory acute eustachian tube dysfunction, especially given normal audiogram and ear exam. There may even be component of rhinogenic pain/migraine given the right septal deviation w contact points further exacerbating her right sided sx. For now, will give short oral steroid taper. Will switch to budesonide nasal spray. Pt reassured - this should hopefully improve over the next few weeks. She will let me know how things are going at that time.

## 2019-07-25 NOTE — PHYSICAL EXAM
[FreeTextEntry1] : Au: EAC clear and dry, TM intact and mobile, ME clear no effusion [Nasal Endoscopy Performed] : nasal endoscopy was performed, see procedure section for findings [] : septum deviated to the right [Midline] : trachea located in midline position [de-identified] : posterior opx clear [Normal] : no rashes

## 2019-07-25 NOTE — PROCEDURE
[FreeTextEntry3] : Nasal Endoscopy:\par inferior turbinates hypertrophic and full\par right mid/superior septal deviation w OMC narrowing\par right fontanelle widely patent, no mucopus or polyps\par choanae clear and dry, b/l ETO widely patent

## 2019-07-25 NOTE — CONSULT LETTER
[Dear  ___] : Dear  [unfilled], [Courtesy Letter:] : I had the pleasure of seeing your patient, [unfilled], in my office today. [Consult Closing:] : Thank you very much for allowing me to participate in the care of this patient.  If you have any questions, please do not hesitate to contact me. [Sincerely,] : Sincerely, [FreeTextEntry3] : Salomón Vivar MD\par Department of Otolaryngology - Head and Neck Surgery\par [facility]

## 2019-07-30 ENCOUNTER — OFFICE VISIT (OUTPATIENT)
Dept: FAMILY MEDICINE | Facility: CLINIC | Age: 64
End: 2019-07-30
Payer: COMMERCIAL

## 2019-07-30 VITALS
BODY MASS INDEX: 31.5 KG/M2 | SYSTOLIC BLOOD PRESSURE: 132 MMHG | HEIGHT: 66 IN | OXYGEN SATURATION: 99 % | TEMPERATURE: 99 F | WEIGHT: 196 LBS | HEART RATE: 84 BPM | DIASTOLIC BLOOD PRESSURE: 87 MMHG

## 2019-07-30 DIAGNOSIS — E78.5 HYPERLIPIDEMIA, UNSPECIFIED HYPERLIPIDEMIA TYPE: ICD-10-CM

## 2019-07-30 DIAGNOSIS — K21.9 GASTROESOPHAGEAL REFLUX DISEASE, ESOPHAGITIS PRESENCE NOT SPECIFIED: ICD-10-CM

## 2019-07-30 DIAGNOSIS — R22.1 NECK MASS: Primary | ICD-10-CM

## 2019-07-30 DIAGNOSIS — K27.9 PUD (PEPTIC ULCER DISEASE): ICD-10-CM

## 2019-07-30 DIAGNOSIS — I10 ESSENTIAL HYPERTENSION, BENIGN: ICD-10-CM

## 2019-07-30 DIAGNOSIS — Z72.0 TOBACCO ABUSE: ICD-10-CM

## 2019-07-30 PROCEDURE — 3075F PR MOST RECENT SYSTOLIC BLOOD PRESS GE 130-139MM HG: ICD-10-PCS | Mod: CPTII,S$GLB,, | Performed by: FAMILY MEDICINE

## 2019-07-30 PROCEDURE — 3075F SYST BP GE 130 - 139MM HG: CPT | Mod: CPTII,S$GLB,, | Performed by: FAMILY MEDICINE

## 2019-07-30 PROCEDURE — 99214 PR OFFICE/OUTPT VISIT, EST, LEVL IV, 30-39 MIN: ICD-10-PCS | Mod: S$GLB,,, | Performed by: FAMILY MEDICINE

## 2019-07-30 PROCEDURE — 3008F BODY MASS INDEX DOCD: CPT | Mod: CPTII,S$GLB,, | Performed by: FAMILY MEDICINE

## 2019-07-30 PROCEDURE — 3079F PR MOST RECENT DIASTOLIC BLOOD PRESSURE 80-89 MM HG: ICD-10-PCS | Mod: CPTII,S$GLB,, | Performed by: FAMILY MEDICINE

## 2019-07-30 PROCEDURE — 99214 OFFICE O/P EST MOD 30 MIN: CPT | Mod: S$GLB,,, | Performed by: FAMILY MEDICINE

## 2019-07-30 PROCEDURE — 99999 PR PBB SHADOW E&M-EST. PATIENT-LVL III: ICD-10-PCS | Mod: PBBFAC,,, | Performed by: FAMILY MEDICINE

## 2019-07-30 PROCEDURE — 99999 PR PBB SHADOW E&M-EST. PATIENT-LVL III: CPT | Mod: PBBFAC,,, | Performed by: FAMILY MEDICINE

## 2019-07-30 PROCEDURE — 3079F DIAST BP 80-89 MM HG: CPT | Mod: CPTII,S$GLB,, | Performed by: FAMILY MEDICINE

## 2019-07-30 PROCEDURE — 3008F PR BODY MASS INDEX (BMI) DOCUMENTED: ICD-10-PCS | Mod: CPTII,S$GLB,, | Performed by: FAMILY MEDICINE

## 2019-07-30 NOTE — PROGRESS NOTES
Office Visit    Patient Name: Greer Valentin    : 1955  MRN: [id number]      Assessment/Plan:  Greer Valentin is a 63 y.o. female who presents today for :    Neck mass  -     CT Soft Tissue Neck W WO Contrast; Future; Expected date: 2019  -     Creatinine, serum; Future; Expected date: 2019  -hard neck mass growing in size the past 4 months - non-TTP, does not appear infection in nature. Will check CT for further eval given concern for malignancy.    Essential hypertension, benign  Hyperlipidemia, unspecified hyperlipidemia type  Tobacco abuse  -BP in appropriate range  -continue current medications   - ?advised DASH diet, portion control, regular cardiovascular exercises  - ?counseled on weight loss  - ?counseled on smoking cessation    Gastroesophageal reflux disease, esophagitis presence not specified  PUD (peptic ulcer disease)  -continue with antacid daily and avoid triggers  d/w pt about the benefits and side effects of chronic PPI use, may use TUMs in addition to dietary modification to improve symptoms  - handout given  -avoid spicy/greasy/sour/acidic foods, as well as tea/coffee/chocolate if possible  -Tylenol as needed for pain, avoid NSAIDs  -Keep food diary          Follow up for any evaluation as needed.     This note was created by combination of typed  and Dragon dictation.  Transcription errors may be present.  If there are any questions, please contact me.      ----------------------------------------------------------------------------------------------------------------------      HPI:  Patient Care Team:  Daniel Padgett MD as PCP - General (Family Medicine)    Greer is a 63 y.o. female with      Patient Active Problem List   Diagnosis    Tobacco abuse    Essential hypertension, benign    Gastroesophageal reflux disease    Hyperlipidemia    PUD (peptic ulcer disease)     This patient is new to me       Patient presents today for:  Mass  in the L neck below the L ear  "that has been enlarging for the past 4 months. No pain/redness/facial discomfort. She thinks it may be an effect from her chin lift and facial cosmetic surgery over 15 years ago but is not sure. She has no ear pain, dysphagia. She has had about a 10lb weight loss the past 3 months, but no fatigue nor malaise. She has HTN that is controlled at home. She has +epigastric pain this past week after going on a vacation and admits that her diet has been "bad", consisting of greasy foods, which upsets her stomach after meals. She had been on Protonix and tried to be back on it consistently. She had been referred for colonoscopy but is awaiting scheduling. No changes in appetite nor BM.     Additional ROS    No F/C/fatigue  No dysphagia/sore throat  No CP/ALLEN/palpitations/swelling  No cough/wheezing/SOB  No nausea/vomiting/no diarrhea, no constipation, no blood in stool  No MSK weakness/HA/tingling/numbness  No rashes  No anxiety/depression  No dysuria/hematuria              Patient Active Problem List   Diagnosis    Tobacco abuse    Essential hypertension, benign    Gastroesophageal reflux disease    Hyperlipidemia    PUD (peptic ulcer disease)       Current Medications  Medications reviewed/updated.     Current Outpatient Medications on File Prior to Visit   Medication Sig Dispense Refill    amLODIPine (NORVASC) 10 MG tablet   0    aspirin (ECOTRIN) 81 MG EC tablet Take 81 mg by mouth once daily.      atorvastatin (LIPITOR) 20 MG tablet Take 1 tablet (20 mg total) by mouth once daily. 90 tablet 3    cyclobenzaprine (FLEXERIL) 10 MG tablet Take 10 mg by mouth 3 (three) times daily as needed for Muscle spasms.      gabapentin (NEURONTIN) 300 MG capsule Take 300 mg by mouth 3 (three) times daily.      lisinopril 10 MG tablet Take 1 tablet (10 mg total) by mouth once daily. 30 tablet 3    nystatin (DUKE'S SOLUTION) Take 10 mLs by mouth every 4 (four) hours as needed (abdominal pain/burning sensation). 120 mL 0    " "pantoprazole (PROTONIX) 40 MG tablet Take 1 tablet (40 mg total) by mouth once daily. 90 tablet 3    polyethylene glycol (COLYTE) 240-22.72-6.72 -5.84 gram SolR   0    UNKNOWN TO PATIENT        No current facility-administered medications on file prior to visit.            Past Surgical History:   Procedure Laterality Date    chin lift      cosmetic surgery - done around 2005?       Family History   Problem Relation Age of Onset    Cancer Neg Hx        Social History     Socioeconomic History    Marital status:      Spouse name: Not on file    Number of children: Not on file    Years of education: Not on file    Highest education level: Not on file   Occupational History    Not on file   Social Needs    Financial resource strain: Not on file    Food insecurity:     Worry: Not on file     Inability: Not on file    Transportation needs:     Medical: Not on file     Non-medical: Not on file   Tobacco Use    Smoking status: Current Some Day Smoker    Smokeless tobacco: Never Used   Substance and Sexual Activity    Alcohol use: No     Alcohol/week: 0.0 oz     Frequency: Never    Drug use: No    Sexual activity: Never   Lifestyle    Physical activity:     Days per week: Not on file     Minutes per session: Not on file    Stress: Not on file   Relationships    Social connections:     Talks on phone: Not on file     Gets together: Not on file     Attends Jainism service: Not on file     Active member of club or organization: Not on file     Attends meetings of clubs or organizations: Not on file     Relationship status: Not on file   Other Topics Concern    Not on file   Social History Narrative    Not on file             Allergies   Review of patient's allergies indicates:  No Known Allergies          Review of Systems  See HPI      Physical Exam  /87   Pulse 84   Temp 98.6 °F (37 °C) (Oral)   Ht 5' 6" (1.676 m)   Wt 88.9 kg (195 lb 15.8 oz)   SpO2 99%   BMI 31.63 kg/m²     GEN: " NAD, well developed, pleasant, well nourished  HEENT: NCAT, PERRLA, EOMI, sclera clear, anicteric, O/P clear, MMM with no lesions, TM clear b/l.  NECK: normal, supple with midline trachea, no LAD, no thyromegaly, +3cm neck mass in the L mandibular angle below L ear, hard to touch - no redness/TTP/warmth/fluctance  LUNGS: CTAB, no w/r/r, no increased work of breathing   HEART: RRR, normal S1 and S2, no m/r/g, no edema  ABD: s/nt/nd, NABS  SKIN: normal turgor, no rashes  PSYCH: AOx3, appropriate mood and affect  MSK: warm/well perfused, normal ROM in all extremities, no c/c/e.

## 2019-08-19 ENCOUNTER — TRANSCRIPTION ENCOUNTER (OUTPATIENT)
Age: 64
End: 2019-08-19

## 2019-11-10 DIAGNOSIS — I10 ESSENTIAL HYPERTENSION, BENIGN: ICD-10-CM

## 2019-11-10 RX ORDER — LISINOPRIL 10 MG/1
TABLET ORAL
Qty: 90 TABLET | Refills: 1 | Status: SHIPPED | OUTPATIENT
Start: 2019-11-10 | End: 2020-05-07

## 2019-11-18 DIAGNOSIS — Z12.11 COLON CANCER SCREENING: ICD-10-CM

## 2019-12-26 ENCOUNTER — APPOINTMENT (OUTPATIENT)
Dept: OTOLARYNGOLOGY | Facility: CLINIC | Age: 64
End: 2019-12-26
Payer: COMMERCIAL

## 2019-12-26 VITALS
BODY MASS INDEX: 25.65 KG/M2 | HEART RATE: 67 BPM | HEIGHT: 63.5 IN | SYSTOLIC BLOOD PRESSURE: 116 MMHG | WEIGHT: 146.6 LBS | DIASTOLIC BLOOD PRESSURE: 80 MMHG

## 2019-12-26 DIAGNOSIS — H93.8X1 OTHER SPECIFIED DISORDERS OF RIGHT EAR: ICD-10-CM

## 2019-12-26 PROCEDURE — 99214 OFFICE O/P EST MOD 30 MIN: CPT

## 2019-12-26 NOTE — PHYSICAL EXAM
[Nasal Endoscopy Performed] : nasal endoscopy was performed, see procedure section for findings [] : septum deviated to the right [Midline] : trachea located in midline position [de-identified] : posterior opx clear [Normal] : no rashes [FreeTextEntry1] : Au: EAC clear and dry, TM intact and mobile, ME clear no effusion, no gross psoriatic plaques seen at all

## 2019-12-26 NOTE — HISTORY OF PRESENT ILLNESS
[de-identified] : 64F here in followup.\par \par Last seen 6 months ago.\par Over the past 1-2 months, she c/o occasional right sided ear itching, dryness and discomfort. Since last visit she was diagnosed with psoriasis over her scalp and she thinks her right sided ear sx are due to extension of the psoriasis into her ear canal. She was given steroid ointment in treatment by her dermatologist, but has not used it yet.\par After last visit her prior sx (right ear fullness, pressure over her right cheek) improved dramatically with the steroid taper and nasal steroid spray.\par There is no otorrhea, tinnitus or vertigo. The left ear feels fine. There is no nasal obstruction/congestion, no green drainage and olfaction is strong.\par \par She has known allergies - mold, dander, pollen - just takes meds as needed.\par She also has h/o herpes/shingles, and takes daily valcyclovir.\par \par Audiogram from last visit is completely normal.\par \par ROS otherwise unremarkable.

## 2019-12-26 NOTE — CONSULT LETTER
[Courtesy Letter:] : I had the pleasure of seeing your patient, [unfilled], in my office today. [Dear  ___] : Dear  [unfilled], [Consult Closing:] : Thank you very much for allowing me to participate in the care of this patient.  If you have any questions, please do not hesitate to contact me. [Sincerely,] : Sincerely, [FreeTextEntry3] : Salomón Vivar MD\par Department of Otolaryngology - Head and Neck Surgery\par [facility]

## 2019-12-26 NOTE — ASSESSMENT
[FreeTextEntry1] : 64F here in followup. She was last seen 6 months ago.\par Over the past 1-2 months, she c/o occasional right sided ear itching, dryness and discomfort. Since last visit she was diagnosed with psoriasis over her scalp and she thinks her right sided ear sx are due to extension of the psoriasis into her ear canal. She was given steroid ointment in treatment by her dermatologist, but has not used it yet. After her last visit her prior sx (right ear fullness, pressure over her right cheek) improved dramatically with the steroid taper. Audiogram at that time was completely normal. \par Complete and comprehensive head and neck exam today is unremarkable; both ear canals appear grossly normal.\par It is possible her right sided ear issues are related to psoriasis, but the exam is grossly normal without any obvious psoriatic plaques. Should she want to try the steroid ointment, I reiterated she can carefully put small film on qtip and place it in the opening of the right ear canal, but no further. RTO as needed.

## 2020-02-11 ENCOUNTER — OFFICE VISIT (OUTPATIENT)
Dept: FAMILY MEDICINE | Facility: CLINIC | Age: 65
End: 2020-02-11
Payer: COMMERCIAL

## 2020-02-11 VITALS
BODY MASS INDEX: 29.88 KG/M2 | HEIGHT: 66 IN | OXYGEN SATURATION: 99 % | HEART RATE: 83 BPM | DIASTOLIC BLOOD PRESSURE: 88 MMHG | WEIGHT: 185.94 LBS | TEMPERATURE: 98 F | SYSTOLIC BLOOD PRESSURE: 138 MMHG

## 2020-02-11 DIAGNOSIS — M19.90 OSTEOARTHRITIS, UNSPECIFIED OSTEOARTHRITIS TYPE, UNSPECIFIED SITE: ICD-10-CM

## 2020-02-11 DIAGNOSIS — Z12.11 ENCOUNTER FOR FIT (FECAL IMMUNOCHEMICAL TEST) SCREENING: ICD-10-CM

## 2020-02-11 DIAGNOSIS — E78.5 HYPERLIPIDEMIA, UNSPECIFIED HYPERLIPIDEMIA TYPE: Chronic | ICD-10-CM

## 2020-02-11 DIAGNOSIS — Z23 NEED FOR SHINGLES VACCINE: ICD-10-CM

## 2020-02-11 DIAGNOSIS — Z12.11 ENCOUNTER FOR SCREENING FOR MALIGNANT NEOPLASM OF COLON: ICD-10-CM

## 2020-02-11 DIAGNOSIS — Z00.00 ROUTINE PHYSICAL EXAMINATION: Primary | ICD-10-CM

## 2020-02-11 DIAGNOSIS — I10 ESSENTIAL HYPERTENSION, BENIGN: ICD-10-CM

## 2020-02-11 DIAGNOSIS — Z23 NEED FOR TDAP VACCINATION: ICD-10-CM

## 2020-02-11 PROCEDURE — 99396 PR PREVENTIVE VISIT,EST,40-64: ICD-10-PCS | Mod: 25,S$GLB,, | Performed by: FAMILY MEDICINE

## 2020-02-11 PROCEDURE — 90471 IMMUNIZATION ADMIN: CPT | Mod: S$GLB,,, | Performed by: FAMILY MEDICINE

## 2020-02-11 PROCEDURE — 90472 IMMUNIZATION ADMIN EACH ADD: CPT | Mod: S$GLB,,, | Performed by: FAMILY MEDICINE

## 2020-02-11 PROCEDURE — 90715 TDAP VACCINE 7 YRS/> IM: CPT | Mod: S$GLB,,, | Performed by: FAMILY MEDICINE

## 2020-02-11 PROCEDURE — 3079F PR MOST RECENT DIASTOLIC BLOOD PRESSURE 80-89 MM HG: ICD-10-PCS | Mod: CPTII,S$GLB,, | Performed by: FAMILY MEDICINE

## 2020-02-11 PROCEDURE — 90750 HZV VACC RECOMBINANT IM: CPT | Mod: S$GLB,,, | Performed by: FAMILY MEDICINE

## 2020-02-11 PROCEDURE — 90472 TDAP VACCINE GREATER THAN OR EQUAL TO 7YO IM: ICD-10-PCS | Mod: S$GLB,,, | Performed by: FAMILY MEDICINE

## 2020-02-11 PROCEDURE — 90715 TDAP VACCINE GREATER THAN OR EQUAL TO 7YO IM: ICD-10-PCS | Mod: S$GLB,,, | Performed by: FAMILY MEDICINE

## 2020-02-11 PROCEDURE — 3075F SYST BP GE 130 - 139MM HG: CPT | Mod: CPTII,S$GLB,, | Performed by: FAMILY MEDICINE

## 2020-02-11 PROCEDURE — 90750 ZOSTER RECOMBINANT VACCINE: ICD-10-PCS | Mod: S$GLB,,, | Performed by: FAMILY MEDICINE

## 2020-02-11 PROCEDURE — 90471 ZOSTER RECOMBINANT VACCINE: ICD-10-PCS | Mod: S$GLB,,, | Performed by: FAMILY MEDICINE

## 2020-02-11 PROCEDURE — 99999 PR PBB SHADOW E&M-EST. PATIENT-LVL III: CPT | Mod: PBBFAC,,, | Performed by: FAMILY MEDICINE

## 2020-02-11 PROCEDURE — 99999 PR PBB SHADOW E&M-EST. PATIENT-LVL III: ICD-10-PCS | Mod: PBBFAC,,, | Performed by: FAMILY MEDICINE

## 2020-02-11 PROCEDURE — 99396 PREV VISIT EST AGE 40-64: CPT | Mod: 25,S$GLB,, | Performed by: FAMILY MEDICINE

## 2020-02-11 PROCEDURE — 3075F PR MOST RECENT SYSTOLIC BLOOD PRESS GE 130-139MM HG: ICD-10-PCS | Mod: CPTII,S$GLB,, | Performed by: FAMILY MEDICINE

## 2020-02-11 PROCEDURE — 3079F DIAST BP 80-89 MM HG: CPT | Mod: CPTII,S$GLB,, | Performed by: FAMILY MEDICINE

## 2020-02-11 RX ORDER — DICLOFENAC SODIUM 10 MG/G
2 GEL TOPICAL 4 TIMES DAILY
COMMUNITY
End: 2020-02-11 | Stop reason: SDUPTHER

## 2020-02-11 RX ORDER — DICLOFENAC SODIUM 10 MG/G
2 GEL TOPICAL 3 TIMES DAILY PRN
Qty: 100 G | Refills: 3 | Status: SHIPPED | OUTPATIENT
Start: 2020-02-11 | End: 2020-10-05 | Stop reason: SDUPTHER

## 2020-02-11 RX ORDER — IBUPROFEN 800 MG/1
800 TABLET ORAL 3 TIMES DAILY
COMMUNITY
End: 2023-12-11

## 2020-02-11 NOTE — PROGRESS NOTES
[facility]  Progress Note    SUBJECTIVE:     Chief Complaint   Patient presents with    Annual Exam       HPI   Greer Valentin  is a 64 y.o. female here for routine physical exam. Patient has no other new complaints/problems at this time.      Review of patient's allergies indicates:   Allergen Reactions    Sinus allergy Nausea Only       Past Medical History:   Diagnosis Date    Hypertension      Past Surgical History:   Procedure Laterality Date    chin lift      cosmetic surgery - done around 2005?     Family History   Problem Relation Age of Onset    Cancer Neg Hx      Social History     Tobacco Use    Smoking status: Current Some Day Smoker    Smokeless tobacco: Never Used   Substance Use Topics    Alcohol use: No     Alcohol/week: 0.0 standard drinks     Frequency: Never    Drug use: No        Review of Systems   Constitutional: Negative for chills, diaphoresis and fever.   HENT: Negative for congestion, ear pain, hearing loss and sore throat.    Eyes: Negative for photophobia and discharge.   Respiratory: Negative for cough, shortness of breath and wheezing.    Cardiovascular: Negative for chest pain and palpitations.   Gastrointestinal: Negative for abdominal pain, blood in stool, constipation, diarrhea, nausea and vomiting.   Genitourinary: Negative for dysuria and hematuria.   Musculoskeletal: Negative for back pain, myalgias and neck pain.   Skin: Negative for itching and rash.   Neurological: Negative for dizziness, sensory change, focal weakness, weakness and headaches.   Endo/Heme/Allergies: Negative for polydipsia.   All other systems reviewed and are negative.    OBJECTIVE:     Vitals:    02/11/20 0858   BP: 138/88   Pulse: 83   Temp: 98.3 °F (36.8 °C)     Body mass index is 30.01 kg/m².    Physical Exam   Constitutional: She is oriented to person, place, and time and well-developed, well-nourished, and in no distress. No distress.   HENT:   Head: Normocephalic and atraumatic.    Right Ear: Tympanic membrane is not perforated, not erythematous and not bulging. No hemotympanum.   Left Ear: Tympanic membrane is not perforated, not erythematous and not bulging. No hemotympanum.   Mouth/Throat: Oropharynx is clear and moist. No oropharyngeal exudate.   Eyes: Pupils are equal, round, and reactive to light. Conjunctivae and EOM are normal.   Neck: No thyromegaly present.   Cardiovascular: Normal rate, regular rhythm and normal heart sounds. Exam reveals no gallop and no friction rub.   No murmur heard.  Pulmonary/Chest: Effort normal and breath sounds normal. No respiratory distress. She has no wheezes. She has no rales.   Abdominal: Soft. Bowel sounds are normal. She exhibits no distension. There is no tenderness. There is no rebound and no guarding.   Musculoskeletal: Normal range of motion. She exhibits no edema or tenderness.   Lymphadenopathy:     She has no cervical adenopathy.   Neurological: She is alert and oriented to person, place, and time.   Skin: Skin is warm. No rash noted. She is not diaphoretic. No erythema.       Old records were reviewed. Labs and/or images were independently reviewed.    ASSESSMENT     1. Routine physical examination    2. Essential hypertension, benign    3. Hyperlipidemia, unspecified hyperlipidemia type    4. Encounter for screening for malignant neoplasm of colon    5. Encounter for FIT (fecal immunochemical test) screening    6. Need for Tdap vaccination    7. Need for shingles vaccine        PLAN:     Routine physical examination  -     CBC auto differential; Future  -     Comprehensive metabolic panel; Future  -     Hemoglobin A1c; Future  -     Lipid panel; Future  -     TSH; Future  -     T4, free; Future  -     We briefly discussed diet, exercise, and routine preventive exams. All questions and comments addressed.    Essential hypertension, benign   -     Stable. Continue current regimen.    Hyperlipidemia, unspecified hyperlipidemia type   -      Counseled patient about healthy diet, exercise habits, and to increase physical activity.    Encounter for screening for malignant neoplasm of colon  -     Case request GI: COLONOSCOPY    Encounter for FIT (fecal immunochemical test) screening  -     Fecal Immunochemical Test (iFOBT); Future; Expected date: 02/11/2020    Need for Tdap vaccination  -     Tdap Vaccine    Need for shingles vaccine  -     Zoster Recombinant Vaccine      RTC PRCAROLA Padgett MD  02/11/2020 9:13 AM

## 2020-02-11 NOTE — PROGRESS NOTES

## 2020-02-12 ENCOUNTER — LAB VISIT (OUTPATIENT)
Dept: LAB | Facility: HOSPITAL | Age: 65
End: 2020-02-12
Attending: FAMILY MEDICINE
Payer: COMMERCIAL

## 2020-02-12 DIAGNOSIS — Z00.00 ROUTINE PHYSICAL EXAMINATION: ICD-10-CM

## 2020-02-12 LAB
ALBUMIN SERPL BCP-MCNC: 3.9 G/DL (ref 3.5–5.2)
ALP SERPL-CCNC: 143 U/L (ref 55–135)
ALT SERPL W/O P-5'-P-CCNC: 10 U/L (ref 10–44)
ANION GAP SERPL CALC-SCNC: 9 MMOL/L (ref 8–16)
AST SERPL-CCNC: 10 U/L (ref 10–40)
BASOPHILS # BLD AUTO: 0.08 K/UL (ref 0–0.2)
BASOPHILS NFR BLD: 0.6 % (ref 0–1.9)
BILIRUB SERPL-MCNC: 0.4 MG/DL (ref 0.1–1)
BUN SERPL-MCNC: 8 MG/DL (ref 8–23)
CALCIUM SERPL-MCNC: 9.5 MG/DL (ref 8.7–10.5)
CHLORIDE SERPL-SCNC: 106 MMOL/L (ref 95–110)
CHOLEST SERPL-MCNC: 265 MG/DL (ref 120–199)
CHOLEST/HDLC SERPL: 5.6 {RATIO} (ref 2–5)
CO2 SERPL-SCNC: 27 MMOL/L (ref 23–29)
CREAT SERPL-MCNC: 0.7 MG/DL (ref 0.5–1.4)
DIFFERENTIAL METHOD: ABNORMAL
EOSINOPHIL # BLD AUTO: 0.2 K/UL (ref 0–0.5)
EOSINOPHIL NFR BLD: 1.7 % (ref 0–8)
ERYTHROCYTE [DISTWIDTH] IN BLOOD BY AUTOMATED COUNT: 15.3 % (ref 11.5–14.5)
EST. GFR  (AFRICAN AMERICAN): >60 ML/MIN/1.73 M^2
EST. GFR  (NON AFRICAN AMERICAN): >60 ML/MIN/1.73 M^2
GLUCOSE SERPL-MCNC: 85 MG/DL (ref 70–110)
HCT VFR BLD AUTO: 46.3 % (ref 37–48.5)
HDLC SERPL-MCNC: 47 MG/DL (ref 40–75)
HDLC SERPL: 17.7 % (ref 20–50)
HGB BLD-MCNC: 13.7 G/DL (ref 12–16)
IMM GRANULOCYTES # BLD AUTO: 0.04 K/UL (ref 0–0.04)
IMM GRANULOCYTES NFR BLD AUTO: 0.3 % (ref 0–0.5)
LDLC SERPL CALC-MCNC: 188.8 MG/DL (ref 63–159)
LYMPHOCYTES # BLD AUTO: 2.6 K/UL (ref 1–4.8)
LYMPHOCYTES NFR BLD: 18.9 % (ref 18–48)
MCH RBC QN AUTO: 25.4 PG (ref 27–31)
MCHC RBC AUTO-ENTMCNC: 29.6 G/DL (ref 32–36)
MCV RBC AUTO: 86 FL (ref 82–98)
MONOCYTES # BLD AUTO: 0.8 K/UL (ref 0.3–1)
MONOCYTES NFR BLD: 5.7 % (ref 4–15)
NEUTROPHILS # BLD AUTO: 10 K/UL (ref 1.8–7.7)
NEUTROPHILS NFR BLD: 72.8 % (ref 38–73)
NONHDLC SERPL-MCNC: 218 MG/DL
NRBC BLD-RTO: 0 /100 WBC
PLATELET # BLD AUTO: 463 K/UL (ref 150–350)
PMV BLD AUTO: 11.2 FL (ref 9.2–12.9)
POTASSIUM SERPL-SCNC: 3.8 MMOL/L (ref 3.5–5.1)
PROT SERPL-MCNC: 7.6 G/DL (ref 6–8.4)
RBC # BLD AUTO: 5.39 M/UL (ref 4–5.4)
SODIUM SERPL-SCNC: 142 MMOL/L (ref 136–145)
T4 FREE SERPL-MCNC: 1.07 NG/DL (ref 0.71–1.51)
TRIGL SERPL-MCNC: 146 MG/DL (ref 30–150)
TSH SERPL DL<=0.005 MIU/L-ACNC: 1.16 UIU/ML (ref 0.4–4)
WBC # BLD AUTO: 13.73 K/UL (ref 3.9–12.7)

## 2020-02-12 PROCEDURE — 84439 ASSAY OF FREE THYROXINE: CPT

## 2020-02-12 PROCEDURE — 84443 ASSAY THYROID STIM HORMONE: CPT

## 2020-02-12 PROCEDURE — 80053 COMPREHEN METABOLIC PANEL: CPT

## 2020-02-12 PROCEDURE — 80061 LIPID PANEL: CPT

## 2020-02-12 PROCEDURE — 85025 COMPLETE CBC W/AUTO DIFF WBC: CPT

## 2020-02-12 PROCEDURE — 36415 COLL VENOUS BLD VENIPUNCTURE: CPT | Mod: PO

## 2020-02-12 PROCEDURE — 83036 HEMOGLOBIN GLYCOSYLATED A1C: CPT

## 2020-02-13 LAB
ESTIMATED AVG GLUCOSE: 120 MG/DL (ref 68–131)
HBA1C MFR BLD HPLC: 5.8 % (ref 4–5.6)

## 2020-02-17 ENCOUNTER — TELEPHONE (OUTPATIENT)
Dept: FAMILY MEDICINE | Facility: CLINIC | Age: 65
End: 2020-02-17

## 2020-02-17 NOTE — TELEPHONE ENCOUNTER
----- Message from Daniel Padgett MD sent at 2/17/2020  8:19 AM CST -----  Cholesterol very high. Please take meds as directed. Will recheck in 2 months. Sugars slightly borderline. Recommend decreasing sugar/carb intake to prevent diabetes.

## 2020-02-24 ENCOUNTER — LAB VISIT (OUTPATIENT)
Dept: LAB | Facility: HOSPITAL | Age: 65
End: 2020-02-24
Attending: FAMILY MEDICINE
Payer: COMMERCIAL

## 2020-02-24 DIAGNOSIS — Z12.11 ENCOUNTER FOR FIT (FECAL IMMUNOCHEMICAL TEST) SCREENING: ICD-10-CM

## 2020-02-24 PROCEDURE — 82274 ASSAY TEST FOR BLOOD FECAL: CPT

## 2020-03-02 ENCOUNTER — PATIENT MESSAGE (OUTPATIENT)
Dept: ADMINISTRATIVE | Facility: HOSPITAL | Age: 65
End: 2020-03-02

## 2020-03-03 ENCOUNTER — TELEPHONE (OUTPATIENT)
Dept: ADMINISTRATIVE | Facility: HOSPITAL | Age: 65
End: 2020-03-03

## 2020-03-03 NOTE — TELEPHONE ENCOUNTER
Pt.complete Fit Kit on 02/24/20 and would like to cancel Colonoscopy. Pt.was informed if Fit Kit is positive then she would need a Colonoscopy. She was also informed that insurance will only allow one to be paid for.

## 2020-03-04 ENCOUNTER — PATIENT OUTREACH (OUTPATIENT)
Dept: ADMINISTRATIVE | Facility: HOSPITAL | Age: 65
End: 2020-03-04

## 2020-03-04 LAB — HEMOCCULT STL QL IA: NEGATIVE

## 2020-04-30 DIAGNOSIS — E78.5 HYPERLIPIDEMIA, UNSPECIFIED HYPERLIPIDEMIA TYPE: ICD-10-CM

## 2020-04-30 DIAGNOSIS — K27.9 PUD (PEPTIC ULCER DISEASE): ICD-10-CM

## 2020-04-30 RX ORDER — ATORVASTATIN CALCIUM 20 MG/1
20 TABLET, FILM COATED ORAL DAILY
Qty: 90 TABLET | Refills: 1 | Status: SHIPPED | OUTPATIENT
Start: 2020-04-30 | End: 2020-10-23

## 2020-04-30 RX ORDER — PANTOPRAZOLE SODIUM 40 MG/1
40 TABLET, DELAYED RELEASE ORAL DAILY
Qty: 90 TABLET | Refills: 1 | Status: SHIPPED | OUTPATIENT
Start: 2020-04-30 | End: 2020-10-05 | Stop reason: SDUPTHER

## 2020-05-06 DIAGNOSIS — I10 ESSENTIAL HYPERTENSION, BENIGN: ICD-10-CM

## 2020-05-07 ENCOUNTER — TELEPHONE (OUTPATIENT)
Dept: FAMILY MEDICINE | Facility: CLINIC | Age: 65
End: 2020-05-07

## 2020-05-07 RX ORDER — LISINOPRIL 10 MG/1
TABLET ORAL
Qty: 90 TABLET | Refills: 3 | Status: SHIPPED | OUTPATIENT
Start: 2020-05-07 | End: 2021-01-21 | Stop reason: SDUPTHER

## 2020-05-07 NOTE — PROGRESS NOTES
Refill Authorization Note    is requesting a refill authorization.    Brief assessment and rationale for refill: APPROVE: prr               Medication reconciliation completed: No                         Comments:      Requested Prescriptions   Signed Prescriptions Disp Refills    lisinopriL 10 MG tablet 90 tablet 3     Sig: Take 1 tablet by mouth once daily       Cardiovascular:  ACE Inhibitors Passed - 5/6/2020  9:14 AM        Passed - Patient is at least 18 years old        Passed - Last BP in normal range within 360 days.     BP Readings from Last 3 Encounters:   02/11/20 138/88   07/30/19 132/87   03/18/19 132/86              Passed - Office visit in past 12 months or future 90 days.     Recent Outpatient Visits            2 months ago Routine physical examination    [facility] Daniel Padgett MD    9 months ago Neck mass    [facility] Ángel Padgett MD    1 year ago Well woman exam with routine gynecological exam    [facility] [place] [facility] Gali Hennessy MD    1 year ago Routine physical examination    [facility] Daniel Padgett MD    4 years ago Routine gynecological examination    [facility] Elana Parson MD                    Passed - Cr is 1.3 or below and within 360 days     Creatinine   Date Value Ref Range Status   02/12/2020 0.7 0.5 - 1.4 mg/dL Final   03/04/2019 0.7 0.5 - 1.4 mg/dL Final   09/10/2018 0.8 0.5 - 1.4 mg/dL Final              Passed - K in normal range and within 360 days     Potassium   Date Value Ref Range Status   02/12/2020 3.8 3.5 - 5.1 mmol/L Final   03/04/2019 3.4 (L) 3.5 - 5.1 mmol/L Final   09/10/2018 3.8 3.5 - 5.1 mmol/L Final              Passed - eGFR within 360 days     eGFR if non    Date Value Ref Range Status   02/12/2020 >60.0 >60 mL/min/1.73 m^2 Final     Comment:     Calculation used to obtain the estimated glomerular filtration  rate (eGFR) is the CKD-EPI equation.       03/04/2019 >60.0 >60 mL/min/1.73 m^2 Final     Comment:     Calculation used to obtain the estimated glomerular filtration  rate (eGFR) is the CKD-EPI equation.      09/10/2018 >60 >60 mL/min/1.73 m^2 Final     Comment:     Calculation used to obtain the estimated glomerular filtration  rate (eGFR) is the CKD-EPI equation.        eGFR if    Date Value Ref Range Status   02/12/2020 >60.0 >60 mL/min/1.73 m^2 Final   03/04/2019 >60.0 >60 mL/min/1.73 m^2 Final   09/10/2018 >60 >60 mL/min/1.73 m^2 Final               Appointments  past 12m or future 3m with PCP    Date Provider   Last Visit   2/11/2020 Daniel Padgett MD   Next Visit   Visit date not found Daniel Padgett MD   ED visits in past 90 days: 0     Note composed:2:56 AM 05/07/2020

## 2020-05-07 NOTE — TELEPHONE ENCOUNTER
Called pt. To reschedule missed Shingrix appt. Pt. Is refusing to have second dose done. Pt. Stated that when first dose was given on 2/11/2020 are are has not stop hurting.

## 2020-07-08 ENCOUNTER — OFFICE VISIT (OUTPATIENT)
Dept: FAMILY MEDICINE | Facility: CLINIC | Age: 65
End: 2020-07-08
Payer: COMMERCIAL

## 2020-07-08 VITALS
WEIGHT: 179.88 LBS | TEMPERATURE: 98 F | HEIGHT: 66 IN | BODY MASS INDEX: 28.91 KG/M2 | DIASTOLIC BLOOD PRESSURE: 77 MMHG | HEART RATE: 105 BPM | OXYGEN SATURATION: 98 % | SYSTOLIC BLOOD PRESSURE: 128 MMHG

## 2020-07-08 DIAGNOSIS — R07.9 CHEST PAIN, UNSPECIFIED TYPE: Primary | ICD-10-CM

## 2020-07-08 DIAGNOSIS — R94.31 ABNORMAL EKG: ICD-10-CM

## 2020-07-08 PROCEDURE — 3008F BODY MASS INDEX DOCD: CPT | Mod: CPTII,S$GLB,, | Performed by: INTERNAL MEDICINE

## 2020-07-08 PROCEDURE — 3078F PR MOST RECENT DIASTOLIC BLOOD PRESSURE < 80 MM HG: ICD-10-PCS | Mod: CPTII,S$GLB,, | Performed by: INTERNAL MEDICINE

## 2020-07-08 PROCEDURE — 99999 PR PBB SHADOW E&M-EST. PATIENT-LVL III: ICD-10-PCS | Mod: PBBFAC,,, | Performed by: INTERNAL MEDICINE

## 2020-07-08 PROCEDURE — 3078F DIAST BP <80 MM HG: CPT | Mod: CPTII,S$GLB,, | Performed by: INTERNAL MEDICINE

## 2020-07-08 PROCEDURE — 93005 ELECTROCARDIOGRAM TRACING: CPT | Mod: S$GLB,,, | Performed by: INTERNAL MEDICINE

## 2020-07-08 PROCEDURE — 3074F PR MOST RECENT SYSTOLIC BLOOD PRESSURE < 130 MM HG: ICD-10-PCS | Mod: CPTII,S$GLB,, | Performed by: INTERNAL MEDICINE

## 2020-07-08 PROCEDURE — 93005 EKG 12-LEAD: ICD-10-PCS | Mod: S$GLB,,, | Performed by: INTERNAL MEDICINE

## 2020-07-08 PROCEDURE — 99215 OFFICE O/P EST HI 40 MIN: CPT | Mod: S$GLB,,, | Performed by: INTERNAL MEDICINE

## 2020-07-08 PROCEDURE — 3074F SYST BP LT 130 MM HG: CPT | Mod: CPTII,S$GLB,, | Performed by: INTERNAL MEDICINE

## 2020-07-08 PROCEDURE — 93010 ELECTROCARDIOGRAM REPORT: CPT | Mod: S$GLB,,, | Performed by: INTERNAL MEDICINE

## 2020-07-08 PROCEDURE — 99215 PR OFFICE/OUTPT VISIT, EST, LEVL V, 40-54 MIN: ICD-10-PCS | Mod: S$GLB,,, | Performed by: INTERNAL MEDICINE

## 2020-07-08 PROCEDURE — 93010 EKG 12-LEAD: ICD-10-PCS | Mod: S$GLB,,, | Performed by: INTERNAL MEDICINE

## 2020-07-08 PROCEDURE — 3008F PR BODY MASS INDEX (BMI) DOCUMENTED: ICD-10-PCS | Mod: CPTII,S$GLB,, | Performed by: INTERNAL MEDICINE

## 2020-07-08 PROCEDURE — 99999 PR PBB SHADOW E&M-EST. PATIENT-LVL III: CPT | Mod: PBBFAC,,, | Performed by: INTERNAL MEDICINE

## 2020-07-08 NOTE — PROGRESS NOTES
Chief complaint chest pain and back pain    64-year-old black female new to me who made an appointment in an opening.  For about a week now she has had a pain in the left anterior chest radiating straight to the back.  It is not positional in any way.  She can not really think of any injury but now she does think perhaps she was carrying a book bag with lots of papers in it.  It is generally less severe in getting better over this past week.  She tried antacids and Maalox without any change.  She does continue on her Protonix.  She does not have any exertional worsening.  On one day she had some pain in the left extensor forearm but really was not anything involving the whole arm or any paresthesias.  She has not noticed any rash suggestive of zoster    Review of systems:  No fevers or chills no signs of bleeding or worsening peptic ulcer disease.  No other neurological symptoms.      Past Medical History:   Diagnosis Date    Gastroesophageal reflux disease 3/4/2019    Hyperlipidemia 3/4/2019    Hypertension     PUD (peptic ulcer disease) 3/4/2019    Tobacco abuse 3/4/2019     Gen: no distress  EYES: conjunctiva clear, non-icteric, PERRL  ENT: nose clear, nasal mucosa normal, oropharynx clear and moist, teeth good  NECK:supple, thyroid non-palpable  RESP: effort is good, lungs clear  CV: heart RRR w/o murmur, gallops or rubs; no carotid bruits, no edema  GI: abdomen soft, non-distended, non-tender, no hepatosplenomegaly  MS: gait normal, no clubbing or cyanosis of the digits, no reproducible pain, shoulders full range of motion with no pain  SKIN: no rashes, warm to touch    Greer was seen today for shoulder pain and back pain.    Diagnoses and all orders for this visit:    Chest pain, unspecified type, differential includes someone recognize muscle strain although it is not positional.  She will continue to assess.  We discussed avoiding anti-inflammatories like Aleve and ibuprofen that she has been taking  "although she is continuing her PPI.  She might want to try Tylenol.  Because of the lack of reproducibility, angina was considered.  Her EKGs today just shows some nonspecific ST segment and T-wave abnormalities but fairly unremarkable although she was not having the pain during the time of the EKG.  Based on the chest pain and the abnormal EKG we will obtain a stress echo and she was advised that if indeed the chest pain worsens in the interval she would go to the emergency room.  Reflux and so forth and gastritis and esophagitis considered as well but she continues on PPI and it does not appear to be that and she has tried antacids without any response.  Also consider zoster and she will continue to watch for rash since her symptoms have been there for one week it is still possible the rash could appear.Total time over 45 minutes with over 50% counseling.  EKG personally reviewed by physician and reviewed with patient after it was obtained and continue to be counseled.  -     EKG 12-lead; Future  -     Stress Echo Which stress agent will be used? Treadmill Exercise; Future    Abnormal EKG  -     Stress Echo Which stress agent will be used? Treadmill Exercise; Future     Based on anxiety expressed referral to the above issues access would not be therapeutic."This note will not be shared with the patient."  "

## 2021-01-20 DIAGNOSIS — Z12.31 OTHER SCREENING MAMMOGRAM: ICD-10-CM

## 2021-01-29 ENCOUNTER — PATIENT MESSAGE (OUTPATIENT)
Dept: ADMINISTRATIVE | Facility: HOSPITAL | Age: 66
End: 2021-01-29

## 2021-04-06 ENCOUNTER — PATIENT MESSAGE (OUTPATIENT)
Dept: ADMINISTRATIVE | Facility: HOSPITAL | Age: 66
End: 2021-04-06

## 2021-04-15 ENCOUNTER — PATIENT MESSAGE (OUTPATIENT)
Dept: RESEARCH | Facility: HOSPITAL | Age: 66
End: 2021-04-15

## 2021-07-06 ENCOUNTER — PATIENT MESSAGE (OUTPATIENT)
Dept: ADMINISTRATIVE | Facility: HOSPITAL | Age: 66
End: 2021-07-06

## 2021-07-27 DIAGNOSIS — Z12.11 ENCOUNTER FOR SCREENING FOR MALIGNANT NEOPLASM OF COLON: Primary | ICD-10-CM

## 2021-10-04 ENCOUNTER — TELEPHONE (OUTPATIENT)
Dept: FAMILY MEDICINE | Facility: CLINIC | Age: 66
End: 2021-10-04

## 2021-10-06 ENCOUNTER — TELEPHONE (OUTPATIENT)
Dept: FAMILY MEDICINE | Facility: CLINIC | Age: 66
End: 2021-10-06

## 2021-10-08 ENCOUNTER — OFFICE VISIT (OUTPATIENT)
Dept: FAMILY MEDICINE | Facility: CLINIC | Age: 66
End: 2021-10-08
Payer: COMMERCIAL

## 2021-10-08 VITALS
HEART RATE: 79 BPM | SYSTOLIC BLOOD PRESSURE: 140 MMHG | DIASTOLIC BLOOD PRESSURE: 88 MMHG | HEIGHT: 66 IN | BODY MASS INDEX: 28.06 KG/M2 | OXYGEN SATURATION: 98 % | WEIGHT: 174.63 LBS | TEMPERATURE: 99 F

## 2021-10-08 DIAGNOSIS — R10.13 EPIGASTRIC PAIN: Primary | ICD-10-CM

## 2021-10-08 DIAGNOSIS — K27.9 PUD (PEPTIC ULCER DISEASE): ICD-10-CM

## 2021-10-08 DIAGNOSIS — I10 ESSENTIAL HYPERTENSION, BENIGN: ICD-10-CM

## 2021-10-08 DIAGNOSIS — E78.5 HYPERLIPIDEMIA, UNSPECIFIED HYPERLIPIDEMIA TYPE: ICD-10-CM

## 2021-10-08 PROCEDURE — 99214 PR OFFICE/OUTPT VISIT, EST, LEVL IV, 30-39 MIN: ICD-10-PCS | Mod: S$GLB,,, | Performed by: INTERNAL MEDICINE

## 2021-10-08 PROCEDURE — 99214 OFFICE O/P EST MOD 30 MIN: CPT | Mod: S$GLB,,, | Performed by: INTERNAL MEDICINE

## 2021-10-08 PROCEDURE — 1159F MED LIST DOCD IN RCRD: CPT | Mod: CPTII,S$GLB,, | Performed by: INTERNAL MEDICINE

## 2021-10-08 PROCEDURE — 3008F PR BODY MASS INDEX (BMI) DOCUMENTED: ICD-10-PCS | Mod: CPTII,S$GLB,, | Performed by: INTERNAL MEDICINE

## 2021-10-08 PROCEDURE — 4010F ACE/ARB THERAPY RXD/TAKEN: CPT | Mod: CPTII,S$GLB,, | Performed by: INTERNAL MEDICINE

## 2021-10-08 PROCEDURE — 1159F PR MEDICATION LIST DOCUMENTED IN MEDICAL RECORD: ICD-10-PCS | Mod: CPTII,S$GLB,, | Performed by: INTERNAL MEDICINE

## 2021-10-08 PROCEDURE — 99999 PR PBB SHADOW E&M-EST. PATIENT-LVL III: ICD-10-PCS | Mod: PBBFAC,,, | Performed by: INTERNAL MEDICINE

## 2021-10-08 PROCEDURE — 3008F BODY MASS INDEX DOCD: CPT | Mod: CPTII,S$GLB,, | Performed by: INTERNAL MEDICINE

## 2021-10-08 PROCEDURE — 3079F PR MOST RECENT DIASTOLIC BLOOD PRESSURE 80-89 MM HG: ICD-10-PCS | Mod: CPTII,S$GLB,, | Performed by: INTERNAL MEDICINE

## 2021-10-08 PROCEDURE — 1101F PR PT FALLS ASSESS DOC 0-1 FALLS W/OUT INJ PAST YR: ICD-10-PCS | Mod: CPTII,S$GLB,, | Performed by: INTERNAL MEDICINE

## 2021-10-08 PROCEDURE — 3077F PR MOST RECENT SYSTOLIC BLOOD PRESSURE >= 140 MM HG: ICD-10-PCS | Mod: CPTII,S$GLB,, | Performed by: INTERNAL MEDICINE

## 2021-10-08 PROCEDURE — 3288F PR FALLS RISK ASSESSMENT DOCUMENTED: ICD-10-PCS | Mod: CPTII,S$GLB,, | Performed by: INTERNAL MEDICINE

## 2021-10-08 PROCEDURE — 3079F DIAST BP 80-89 MM HG: CPT | Mod: CPTII,S$GLB,, | Performed by: INTERNAL MEDICINE

## 2021-10-08 PROCEDURE — 3077F SYST BP >= 140 MM HG: CPT | Mod: CPTII,S$GLB,, | Performed by: INTERNAL MEDICINE

## 2021-10-08 PROCEDURE — 3288F FALL RISK ASSESSMENT DOCD: CPT | Mod: CPTII,S$GLB,, | Performed by: INTERNAL MEDICINE

## 2021-10-08 PROCEDURE — 4010F PR ACE/ARB THEARPY RXD/TAKEN: ICD-10-PCS | Mod: CPTII,S$GLB,, | Performed by: INTERNAL MEDICINE

## 2021-10-08 PROCEDURE — 99999 PR PBB SHADOW E&M-EST. PATIENT-LVL III: CPT | Mod: PBBFAC,,, | Performed by: INTERNAL MEDICINE

## 2021-10-08 PROCEDURE — 1101F PT FALLS ASSESS-DOCD LE1/YR: CPT | Mod: CPTII,S$GLB,, | Performed by: INTERNAL MEDICINE

## 2021-10-08 RX ORDER — PANTOPRAZOLE SODIUM 40 MG/1
40 TABLET, DELAYED RELEASE ORAL DAILY
Qty: 90 TABLET | Refills: 12 | Status: SHIPPED | OUTPATIENT
Start: 2021-10-08 | End: 2023-12-11 | Stop reason: SDUPTHER

## 2021-10-22 ENCOUNTER — TELEPHONE (OUTPATIENT)
Dept: FAMILY MEDICINE | Facility: CLINIC | Age: 66
End: 2021-10-22
Payer: COMMERCIAL

## 2021-12-08 ENCOUNTER — PATIENT MESSAGE (OUTPATIENT)
Dept: ADMINISTRATIVE | Facility: HOSPITAL | Age: 66
End: 2021-12-08
Payer: COMMERCIAL

## 2021-12-15 ENCOUNTER — PATIENT MESSAGE (OUTPATIENT)
Dept: ADMINISTRATIVE | Facility: HOSPITAL | Age: 66
End: 2021-12-15
Payer: COMMERCIAL

## 2021-12-28 ENCOUNTER — NON-APPOINTMENT (OUTPATIENT)
Age: 66
End: 2021-12-28

## 2022-01-06 ENCOUNTER — APPOINTMENT (OUTPATIENT)
Dept: OTOLARYNGOLOGY | Facility: CLINIC | Age: 67
End: 2022-01-06
Payer: MEDICARE

## 2022-01-06 DIAGNOSIS — J34.3 HYPERTROPHY OF NASAL TURBINATES: ICD-10-CM

## 2022-01-06 DIAGNOSIS — J34.2 DEVIATED NASAL SEPTUM: ICD-10-CM

## 2022-01-06 DIAGNOSIS — H81.90 UNSPECIFIED DISORDER OF VESTIBULAR FUNCTION, UNSPECIFIED EAR: ICD-10-CM

## 2022-01-06 PROCEDURE — 99214 OFFICE O/P EST MOD 30 MIN: CPT | Mod: 25

## 2022-01-06 PROCEDURE — 31231 NASAL ENDOSCOPY DX: CPT

## 2022-01-06 RX ORDER — BUDESONIDE 32 UG/1
32 SPRAY, METERED NASAL DAILY
Qty: 1 | Refills: 5 | Status: ACTIVE | COMMUNITY
Start: 2022-01-06 | End: 1900-01-01

## 2022-01-06 NOTE — ASSESSMENT
[FreeTextEntry1] : 66F here in followup. She was  last seen >2 years ago. She has several otolaryngologic complaints.\par For years, she c/o right sided nasal congestion/obstruction w sensation she cannot pass much air through the right nostril. This is most pronounced when she lays down to bed. Left sided breathing is fine without issue. She thinks this started after she fell on her nose [place] subway [place] in 2005. Since then, she also gets occasional right sided URIs w facial pressure which usually resolve on their own without abx. She is not on daily nasal sprays; she had been, but does not like them due to the fragrance.\par Of note, in July 2021 she was treated w steroids for right sided sudden SNHL; her hearing improved back to baseline, but she feels she has had mild imbalance since then which has remained. There is no otorrhea, tinnitus or vertigo. She has h/o stroke in past and has had imbalance before; but this is now a bit worse since the sudden hearing loss over the summer. She has already undergone vestibular therapy in the past. She has known allergies - mold, dander, pollen - just takes meds as needed.\par On exam, there is moderate inferior turbinate hypertrophy; there is marked improvement in right sided breathing after topical decongestion. Nasal endoscopy shows right mid/superior septal deviation and spurring. The rest of the complete and comprehensive head and neck exam is unremarkable.\par Right sided nasal obstruction due to turbinate hypertrophy and septal deviation. This was discussed. Will try rhinocort and see if improved in 1 month and f/u at that time; if sx persist, can discuss turbinate reduction/septoplasty, which was also reviewed.\par Prior imbalance likely worsened from recent episode of idiopathic sudden hearing loss. though the hearing improved, the balance did not. Will refer for further vestibular therapy which should help.

## 2022-01-06 NOTE — HISTORY OF PRESENT ILLNESS
[de-identified] : 66F here in followup.\par \par Last seen >2 years ago.\par \par She has several otolaryngologic complaints.\par For years, she c/o right sided nasal congestion/obstruction w sensation she cannot pass much air through the right nostril. This is most pronounced when she lays down to bed. Left sided breathing is fine without issue. She thinks this started after she fell on her nose while on subway stairs in 2005. Since then, she also gets occasional right sided URIs w facial pressure. These usually resolve on their own without abx. She is not on daily nasal sprays; she had been, but does not like them due to the fragrance.\par \par Of note, July 2021 was treated w steroids for right sided sudden SNHL; her hearing improved back to baseline, but she feels she ahs had mild imbalance since then which has remained. There is no otorrhea, tinnitus or vertigo. \par She has h/o stroke in past and has had imbalance before; but this is now a bit worse since the sudden hearing loss over the summer. She has already undergone vestibular therapy in the past.\par \par She has known allergies - mold, dander, pollen - just takes meds as needed.\par She has psoriasis over her scalp which is managed.\par \par ROS otherwise unremarkable.

## 2022-01-06 NOTE — CONSULT LETTER
[Dear  ___] : Dear  [unfilled], [Courtesy Letter:] : I had the pleasure of seeing your patient, [unfilled], in my office today. [Consult Closing:] : Thank you very much for allowing me to participate in the care of this patient.  If you have any questions, please do not hesitate to contact me. [Sincerely,] : Sincerely, [DrTeri  ___] : Dr. JO [FreeTextEntry3] : Salomón Vivar MD\par Department of Otolaryngology - Head and Neck Surgery\par [facility]

## 2022-01-06 NOTE — PHYSICAL EXAM
[Nasal Endoscopy Performed] : nasal endoscopy was performed, see procedure section for findings [] : septum deviated to the right [Midline] : trachea located in midline position [Normal] : no rashes [de-identified] : posterior opx clear [FreeTextEntry1] : Au: EAC clear and dry, TM intact and mobile, ME clear no effusion, no gross psoriatic plaques seen at all

## 2022-01-06 NOTE — PROCEDURE
[FreeTextEntry3] : Nasal Endoscopy:\par inferior turbinates hypertrophic and full -> marked improvement in right sided breathing w decongestion\par right mid/superior septal deviation/spurring w OMC narrowing\par right fontanelle widely patent, no mucopus or polyps\par choanae clear and dry, b/l ETO widely patent

## 2022-09-14 DIAGNOSIS — Z12.31 OTHER SCREENING MAMMOGRAM: ICD-10-CM

## 2022-09-14 DIAGNOSIS — I10 ESSENTIAL HYPERTENSION, BENIGN: ICD-10-CM

## 2022-09-19 ENCOUNTER — PATIENT MESSAGE (OUTPATIENT)
Dept: ADMINISTRATIVE | Facility: HOSPITAL | Age: 67
End: 2022-09-19
Payer: COMMERCIAL

## 2023-07-20 ENCOUNTER — PATIENT OUTREACH (OUTPATIENT)
Dept: ADMINISTRATIVE | Facility: HOSPITAL | Age: 68
End: 2023-07-20
Payer: COMMERCIAL

## 2023-07-20 DIAGNOSIS — Z12.11 ENCOUNTER FOR SCREENING FOR COLORECTAL MALIGNANT NEOPLASM: ICD-10-CM

## 2023-07-20 DIAGNOSIS — R73.03 PRE-DIABETES: Primary | ICD-10-CM

## 2023-07-20 DIAGNOSIS — Z12.12 ENCOUNTER FOR SCREENING FOR COLORECTAL MALIGNANT NEOPLASM: ICD-10-CM

## 2023-07-27 ENCOUNTER — HOSPITAL ENCOUNTER (OUTPATIENT)
Dept: RADIOLOGY | Facility: HOSPITAL | Age: 68
Discharge: HOME | End: 2023-07-27
Attending: FAMILY MEDICINE
Payer: COMMERCIAL

## 2023-07-27 DIAGNOSIS — Z12.31 OTHER SCREENING MAMMOGRAM: ICD-10-CM

## 2023-07-27 PROCEDURE — 77063 MAMMO DIGITAL SCREENING BILAT WITH TOMO: ICD-10-PCS | Mod: 26,,, | Performed by: RADIOLOGY

## 2023-07-27 PROCEDURE — 77067 SCR MAMMO BI INCL CAD: CPT | Mod: TC,PO

## 2023-07-27 PROCEDURE — 77067 MAMMO DIGITAL SCREENING BILAT WITH TOMO: ICD-10-PCS | Mod: 26,,, | Performed by: RADIOLOGY

## 2023-07-27 PROCEDURE — 77067 SCR MAMMO BI INCL CAD: CPT | Mod: 26,,, | Performed by: RADIOLOGY

## 2023-07-27 PROCEDURE — 77063 BREAST TOMOSYNTHESIS BI: CPT | Mod: 26,,, | Performed by: RADIOLOGY

## 2023-12-11 ENCOUNTER — OFFICE VISIT (OUTPATIENT)
Dept: FAMILY MEDICINE | Facility: CLINIC | Age: 68
End: 2023-12-11
Payer: COMMERCIAL

## 2023-12-11 VITALS
TEMPERATURE: 98 F | OXYGEN SATURATION: 99 % | HEART RATE: 84 BPM | DIASTOLIC BLOOD PRESSURE: 88 MMHG | BODY MASS INDEX: 27.03 KG/M2 | SYSTOLIC BLOOD PRESSURE: 136 MMHG | WEIGHT: 168.19 LBS | HEIGHT: 66 IN

## 2023-12-11 DIAGNOSIS — Z78.0 POST-MENOPAUSAL: ICD-10-CM

## 2023-12-11 DIAGNOSIS — Z23 FLU VACCINE NEED: ICD-10-CM

## 2023-12-11 DIAGNOSIS — E78.5 HYPERLIPIDEMIA, UNSPECIFIED HYPERLIPIDEMIA TYPE: Chronic | ICD-10-CM

## 2023-12-11 DIAGNOSIS — K27.9 PUD (PEPTIC ULCER DISEASE): Chronic | ICD-10-CM

## 2023-12-11 DIAGNOSIS — Z12.11 ENCOUNTER FOR SCREENING FOR MALIGNANT NEOPLASM OF COLON: ICD-10-CM

## 2023-12-11 DIAGNOSIS — Z23 PNEUMOCOCCAL VACCINATION ADMINISTERED AT CURRENT VISIT: ICD-10-CM

## 2023-12-11 DIAGNOSIS — Z00.00 ROUTINE PHYSICAL EXAMINATION: Primary | ICD-10-CM

## 2023-12-11 DIAGNOSIS — Z72.0 TOBACCO ABUSE: ICD-10-CM

## 2023-12-11 DIAGNOSIS — I10 ESSENTIAL HYPERTENSION, BENIGN: Chronic | ICD-10-CM

## 2023-12-11 PROCEDURE — 3075F SYST BP GE 130 - 139MM HG: CPT | Mod: CPTII,S$GLB,, | Performed by: FAMILY MEDICINE

## 2023-12-11 PROCEDURE — 99999 PR PBB SHADOW E&M-EST. PATIENT-LVL IV: ICD-10-PCS | Mod: PBBFAC,,, | Performed by: FAMILY MEDICINE

## 2023-12-11 PROCEDURE — 90694 FLU VACCINE - QUADRIVALENT - ADJUVANTED: ICD-10-PCS | Mod: S$GLB,,, | Performed by: FAMILY MEDICINE

## 2023-12-11 PROCEDURE — 90677 PNEUMOCOCCAL CONJUGATE VACCINE 20-VALENT: ICD-10-PCS | Mod: S$GLB,,, | Performed by: FAMILY MEDICINE

## 2023-12-11 PROCEDURE — 90472 PNEUMOCOCCAL CONJUGATE VACCINE 20-VALENT: ICD-10-PCS | Mod: S$GLB,,, | Performed by: FAMILY MEDICINE

## 2023-12-11 PROCEDURE — 3288F FALL RISK ASSESSMENT DOCD: CPT | Mod: CPTII,S$GLB,, | Performed by: FAMILY MEDICINE

## 2023-12-11 PROCEDURE — 3008F BODY MASS INDEX DOCD: CPT | Mod: CPTII,S$GLB,, | Performed by: FAMILY MEDICINE

## 2023-12-11 PROCEDURE — 3079F PR MOST RECENT DIASTOLIC BLOOD PRESSURE 80-89 MM HG: ICD-10-PCS | Mod: CPTII,S$GLB,, | Performed by: FAMILY MEDICINE

## 2023-12-11 PROCEDURE — 1101F PT FALLS ASSESS-DOCD LE1/YR: CPT | Mod: CPTII,S$GLB,, | Performed by: FAMILY MEDICINE

## 2023-12-11 PROCEDURE — 90471 FLU VACCINE - QUADRIVALENT - ADJUVANTED: ICD-10-PCS | Mod: S$GLB,,, | Performed by: FAMILY MEDICINE

## 2023-12-11 PROCEDURE — 99999 PR PBB SHADOW E&M-EST. PATIENT-LVL IV: CPT | Mod: PBBFAC,,, | Performed by: FAMILY MEDICINE

## 2023-12-11 PROCEDURE — 3288F PR FALLS RISK ASSESSMENT DOCUMENTED: ICD-10-PCS | Mod: CPTII,S$GLB,, | Performed by: FAMILY MEDICINE

## 2023-12-11 PROCEDURE — 1159F MED LIST DOCD IN RCRD: CPT | Mod: CPTII,S$GLB,, | Performed by: FAMILY MEDICINE

## 2023-12-11 PROCEDURE — 1126F AMNT PAIN NOTED NONE PRSNT: CPT | Mod: CPTII,S$GLB,, | Performed by: FAMILY MEDICINE

## 2023-12-11 PROCEDURE — 3008F PR BODY MASS INDEX (BMI) DOCUMENTED: ICD-10-PCS | Mod: CPTII,S$GLB,, | Performed by: FAMILY MEDICINE

## 2023-12-11 PROCEDURE — 1126F PR PAIN SEVERITY QUANTIFIED, NO PAIN PRESENT: ICD-10-PCS | Mod: CPTII,S$GLB,, | Performed by: FAMILY MEDICINE

## 2023-12-11 PROCEDURE — 3075F PR MOST RECENT SYSTOLIC BLOOD PRESS GE 130-139MM HG: ICD-10-PCS | Mod: CPTII,S$GLB,, | Performed by: FAMILY MEDICINE

## 2023-12-11 PROCEDURE — 1101F PR PT FALLS ASSESS DOC 0-1 FALLS W/OUT INJ PAST YR: ICD-10-PCS | Mod: CPTII,S$GLB,, | Performed by: FAMILY MEDICINE

## 2023-12-11 PROCEDURE — 1159F PR MEDICATION LIST DOCUMENTED IN MEDICAL RECORD: ICD-10-PCS | Mod: CPTII,S$GLB,, | Performed by: FAMILY MEDICINE

## 2023-12-11 PROCEDURE — 99397 PR PREVENTIVE VISIT,EST,65 & OVER: ICD-10-PCS | Mod: 25,S$GLB,, | Performed by: FAMILY MEDICINE

## 2023-12-11 PROCEDURE — 90677 PCV20 VACCINE IM: CPT | Mod: S$GLB,,, | Performed by: FAMILY MEDICINE

## 2023-12-11 PROCEDURE — 90694 VACC AIIV4 NO PRSRV 0.5ML IM: CPT | Mod: S$GLB,,, | Performed by: FAMILY MEDICINE

## 2023-12-11 PROCEDURE — 3079F DIAST BP 80-89 MM HG: CPT | Mod: CPTII,S$GLB,, | Performed by: FAMILY MEDICINE

## 2023-12-11 PROCEDURE — 90472 IMMUNIZATION ADMIN EACH ADD: CPT | Mod: S$GLB,,, | Performed by: FAMILY MEDICINE

## 2023-12-11 PROCEDURE — 99397 PER PM REEVAL EST PAT 65+ YR: CPT | Mod: 25,S$GLB,, | Performed by: FAMILY MEDICINE

## 2023-12-11 PROCEDURE — 90471 IMMUNIZATION ADMIN: CPT | Mod: S$GLB,,, | Performed by: FAMILY MEDICINE

## 2023-12-11 RX ORDER — LISINOPRIL 10 MG/1
10 TABLET ORAL DAILY
Qty: 90 TABLET | Refills: 3 | Status: SHIPPED | OUTPATIENT
Start: 2023-12-11 | End: 2024-02-14 | Stop reason: CLARIF

## 2023-12-11 RX ORDER — MICONAZOLE NITRATE 2 %
2 CREAM (GRAM) TOPICAL
Qty: 380 EACH | Refills: 3 | Status: SHIPPED | OUTPATIENT
Start: 2023-12-11 | End: 2024-02-14 | Stop reason: CLARIF

## 2023-12-11 RX ORDER — PANTOPRAZOLE SODIUM 40 MG/1
40 TABLET, DELAYED RELEASE ORAL DAILY
Qty: 90 TABLET | Refills: 3 | Status: SHIPPED | OUTPATIENT
Start: 2023-12-11

## 2023-12-11 RX ORDER — AMLODIPINE BESYLATE 10 MG/1
10 TABLET ORAL DAILY
Qty: 90 TABLET | Refills: 3 | Status: SHIPPED | OUTPATIENT
Start: 2023-12-11

## 2023-12-11 RX ORDER — ATORVASTATIN CALCIUM 20 MG/1
20 TABLET, FILM COATED ORAL DAILY
Qty: 90 TABLET | Refills: 3 | Status: SHIPPED | OUTPATIENT
Start: 2023-12-11 | End: 2024-02-14 | Stop reason: CLARIF

## 2023-12-11 NOTE — PROGRESS NOTES
[facility]  Progress Note    SUBJECTIVE:     Chief Complaint   Patient presents with    Annual Exam    Gastroesophageal Reflux       HPI   Greer Valentin  is a 68 y.o. female here for physical exam. Also has issues with reflux lately. Admits been taking ibuprofen. Patient has no other new complaints/problems at this time.      Review of patient's allergies indicates:   Allergen Reactions    Sinus allergy Nausea Only       Past Medical History:   Diagnosis Date    Gastroesophageal reflux disease 3/4/2019    Hyperlipidemia 3/4/2019    Hypertension     PUD (peptic ulcer disease) 3/4/2019    Tobacco abuse 3/4/2019     Past Surgical History:   Procedure Laterality Date    chin lift      cosmetic surgery - done around 2005?     Family History   Problem Relation Age of Onset    Cancer Neg Hx      Social History     Tobacco Use    Smoking status: Some Days    Smokeless tobacco: Never   Substance Use Topics    Alcohol use: No     Alcohol/week: 0.0 standard drinks of alcohol    Drug use: No        Review of Systems   Constitutional:  Negative for chills, diaphoresis and fever.   HENT:  Negative for congestion, ear pain and sore throat.    Eyes:  Negative for photophobia and discharge.   Respiratory:  Negative for cough, shortness of breath and wheezing.    Cardiovascular:  Negative for chest pain and palpitations.   Gastrointestinal:  Positive for heartburn. Negative for abdominal pain, constipation, diarrhea, nausea and vomiting.   Genitourinary:  Negative for dysuria and hematuria.   Musculoskeletal:  Negative for back pain and myalgias.   Skin:  Negative for itching and rash.   Neurological:  Negative for dizziness, sensory change, focal weakness, weakness and headaches.   All other systems reviewed and are negative.    OBJECTIVE:     Vitals:    12/11/23 1323   BP: (!) 136/98   Pulse:    Temp:      Body mass index is 27.15 kg/m².    Physical Exam  Constitutional:       General: She is not in acute distress.      Appearance: She is not diaphoretic.   HENT:      Head: Normocephalic and atraumatic.      Right Ear: Tympanic membrane and ear canal normal. No hemotympanum. Tympanic membrane is not perforated, erythematous or bulging.      Left Ear: Tympanic membrane and ear canal normal. No hemotympanum. Tympanic membrane is not perforated, erythematous or bulging.   Eyes:      Conjunctiva/sclera: Conjunctivae normal.      Pupils: Pupils are equal, round, and reactive to light.   Neck:      Thyroid: No thyromegaly.   Cardiovascular:      Rate and Rhythm: Normal rate and regular rhythm.      Heart sounds: Normal heart sounds. No murmur heard.     No friction rub. No gallop.   Pulmonary:      Effort: Pulmonary effort is normal. No respiratory distress.      Breath sounds: Normal breath sounds. No wheezing or rales.   Abdominal:      General: Bowel sounds are normal. There is no distension.      Palpations: Abdomen is soft.      Tenderness: There is no abdominal tenderness. There is no guarding or rebound.   Musculoskeletal:         General: No tenderness. Normal range of motion.   Skin:     General: Skin is warm.      Findings: No erythema or rash.   Neurological:      Mental Status: She is alert and oriented to person, place, and time.         Old records were reviewed. Labs and/or images were independently reviewed.    ASSESSMENT     1. Routine physical examination    2. PUD (peptic ulcer disease)    3. Essential hypertension, benign    4. Hyperlipidemia, unspecified hyperlipidemia type    5. Encounter for screening for malignant neoplasm of colon    6. Pneumococcal vaccination administered at current visit    7. Post-menopausal        PLAN:     Routine physical examination  -     CBC Auto Differential; Future  -     Comprehensive Metabolic Panel; Future  -     Hemoglobin A1C; Future  -     Lipid Panel; Future  -     TSH; Future  -     T4, Free; Future  -     We briefly discussed diet, exercise, and routine preventive exams. All  questions and comments addressed.    PUD (peptic ulcer disease)  -     pantoprazole (PROTONIX) 40 MG tablet; Take 1 tablet (40 mg total) by mouth once daily.  Dispense: 90 tablet; Refill: 3  -     advised to not take any ibuprofen.     Essential hypertension, benign  -     amLODIPine (NORVASC) 10 MG tablet; Take 1 tablet (10 mg total) by mouth once daily.  Dispense: 90 tablet; Refill: 3  -     lisinopriL 10 MG tablet; Take 1 tablet (10 mg total) by mouth once daily.  Dispense: 90 tablet; Refill: 3  -     Stable. Continue current regimen.    Hyperlipidemia, unspecified hyperlipidemia type   -     Counseled patient about healthy diet, exercise habits, and to increase physical activity.    Encounter for screening for malignant neoplasm of colon  -     Ambulatory referral/consult to Endo Procedure ; Future; Expected date: 12/12/2023    Pneumococcal vaccination administered at current visit  -     Pneumococcal Conjugate Vaccine (20 Valent) (IM)(Preferred)    Post-menopausal  -     DXA Bone Density Axial Skeleton 1 or more sites; Future; Expected date: 12/11/2023    Other orders  -     Influenza (FLUAD) - Quadrivalent (Adjuvanted) *Preferred* (65+) (PF)      RTC PRCAROLA Padgett MD  12/11/2023 1:32 PM

## 2024-01-25 ENCOUNTER — CLINICAL SUPPORT (OUTPATIENT)
Dept: ENDOSCOPY | Facility: HOSPITAL | Age: 69
End: 2024-01-25
Attending: FAMILY MEDICINE
Payer: MEDICARE

## 2024-01-25 ENCOUNTER — TELEPHONE (OUTPATIENT)
Dept: ENDOSCOPY | Facility: HOSPITAL | Age: 69
End: 2024-01-25

## 2024-01-25 DIAGNOSIS — Z12.11 ENCOUNTER FOR SCREENING FOR MALIGNANT NEOPLASM OF COLON: ICD-10-CM

## 2024-01-25 NOTE — TELEPHONE ENCOUNTER
Attempted to contact patient to schedule colonoscopy. Left voicemail message with information to call Endoscopy scheduling department at [phone] to schedule procedure. Also, sent message via patient portal.

## 2024-01-25 NOTE — PLAN OF CARE
We attempted to reach you for your scheduled PAT appointment to schedule your colonoscopy. Left voicemail message. Please contact Endoscopy Scheduling at # [phone].

## 2024-02-05 ENCOUNTER — CLINICAL SUPPORT (OUTPATIENT)
Dept: ENDOSCOPY | Facility: HOSPITAL | Age: 69
End: 2024-02-05
Attending: FAMILY MEDICINE
Payer: MEDICARE

## 2024-02-05 ENCOUNTER — TELEPHONE (OUTPATIENT)
Dept: ENDOSCOPY | Facility: HOSPITAL | Age: 69
End: 2024-02-05

## 2024-02-05 DIAGNOSIS — Z12.11 ENCOUNTER FOR SCREENING FOR MALIGNANT NEOPLASM OF COLON: ICD-10-CM

## 2024-02-05 NOTE — TELEPHONE ENCOUNTER
Contacted patient to schedule colonoscopy. Patient states she has no insurance and will call us back. # [phone] given to call. Patient verbalized understanding.

## 2024-02-05 NOTE — PLAN OF CARE
Contacted patient to schedule colonoscopy. Patient states she has no insurance and will call us back. # [phone] given to call. Patient verbalized understanding.

## 2024-02-14 ENCOUNTER — HOSPITAL ENCOUNTER (INPATIENT)
Facility: HOSPITAL | Age: 69
LOS: 1 days | Discharge: HOME | DRG: 291 | End: 2024-02-16
Attending: EMERGENCY MEDICINE | Admitting: FAMILY MEDICINE
Payer: MEDICARE

## 2024-02-14 DIAGNOSIS — I50.9 NEW ONSET OF CONGESTIVE HEART FAILURE: Primary | ICD-10-CM

## 2024-02-14 DIAGNOSIS — I27.20 HYPERTENSIVE PULMONARY VASCULAR DISEASE: ICD-10-CM

## 2024-02-14 DIAGNOSIS — I50.9 CHF (CONGESTIVE HEART FAILURE): ICD-10-CM

## 2024-02-14 DIAGNOSIS — R06.02 SHORTNESS OF BREATH: ICD-10-CM

## 2024-02-14 PROBLEM — J81.1 PULMONARY EDEMA: Status: ACTIVE | Noted: 2024-02-14

## 2024-02-14 PROBLEM — E87.6 HYPOKALEMIA: Status: ACTIVE | Noted: 2024-02-14

## 2024-02-14 LAB
ALBUMIN SERPL BCP-MCNC: 3.7 G/DL (ref 3.5–5.2)
ALP SERPL-CCNC: 126 U/L (ref 55–135)
ALT SERPL W/O P-5'-P-CCNC: 8 U/L (ref 10–44)
ANION GAP SERPL CALC-SCNC: 7 MMOL/L (ref 8–16)
AST SERPL-CCNC: 10 U/L (ref 10–40)
BASOPHILS # BLD AUTO: 0.06 K/UL (ref 0–0.2)
BASOPHILS NFR BLD: 0.5 % (ref 0–1.9)
BILIRUB SERPL-MCNC: 0.5 MG/DL (ref 0.1–1)
BILIRUB UR QL STRIP: NEGATIVE
BNP SERPL-MCNC: 781 PG/ML (ref 0–99)
BUN SERPL-MCNC: 6 MG/DL (ref 8–23)
CALCIUM SERPL-MCNC: 9 MG/DL (ref 8.7–10.5)
CHLORIDE SERPL-SCNC: 107 MMOL/L (ref 95–110)
CLARITY UR: ABNORMAL
CO2 SERPL-SCNC: 30 MMOL/L (ref 23–29)
COLOR UR: YELLOW
CREAT SERPL-MCNC: 0.8 MG/DL (ref 0.5–1.4)
DIFFERENTIAL METHOD BLD: ABNORMAL
EOSINOPHIL # BLD AUTO: 0.2 K/UL (ref 0–0.5)
EOSINOPHIL NFR BLD: 1.6 % (ref 0–8)
ERYTHROCYTE [DISTWIDTH] IN BLOOD BY AUTOMATED COUNT: 14 % (ref 11.5–14.5)
EST. GFR  (NO RACE VARIABLE): >60 ML/MIN/1.73 M^2
GLUCOSE SERPL-MCNC: 85 MG/DL (ref 70–110)
GLUCOSE UR QL STRIP: NEGATIVE
HCT VFR BLD AUTO: 40.6 % (ref 37–48.5)
HGB BLD-MCNC: 12.6 G/DL (ref 12–16)
HGB UR QL STRIP: NEGATIVE
IMM GRANULOCYTES # BLD AUTO: 0.03 K/UL (ref 0–0.04)
IMM GRANULOCYTES NFR BLD AUTO: 0.2 % (ref 0–0.5)
KETONES UR QL STRIP: NEGATIVE
LEUKOCYTE ESTERASE UR QL STRIP: ABNORMAL
LYMPHOCYTES # BLD AUTO: 3.1 K/UL (ref 1–4.8)
LYMPHOCYTES NFR BLD: 25.3 % (ref 18–48)
MAGNESIUM SERPL-MCNC: 1.6 MG/DL (ref 1.6–2.6)
MCH RBC QN AUTO: 25.4 PG (ref 27–31)
MCHC RBC AUTO-ENTMCNC: 31 G/DL (ref 32–36)
MCV RBC AUTO: 82 FL (ref 82–98)
MICROSCOPIC COMMENT: ABNORMAL
MONOCYTES # BLD AUTO: 0.7 K/UL (ref 0.3–1)
MONOCYTES NFR BLD: 5.8 % (ref 4–15)
NEUTROPHILS # BLD AUTO: 8.1 K/UL (ref 1.8–7.7)
NEUTROPHILS NFR BLD: 66.6 % (ref 38–73)
NITRITE UR QL STRIP: NEGATIVE
NRBC BLD-RTO: 0 /100 WBC
PH UR STRIP: 6 [PH] (ref 5–8)
PLATELET # BLD AUTO: 441 K/UL (ref 150–450)
PMV BLD AUTO: 10 FL (ref 9.2–12.9)
POTASSIUM SERPL-SCNC: 3.3 MMOL/L (ref 3.5–5.1)
PROT SERPL-MCNC: 7.5 G/DL (ref 6–8.4)
PROT UR QL STRIP: ABNORMAL
RBC # BLD AUTO: 4.96 M/UL (ref 4–5.4)
RBC #/AREA URNS HPF: 2 /HPF (ref 0–4)
SODIUM SERPL-SCNC: 144 MMOL/L (ref 136–145)
SP GR UR STRIP: 1.02 (ref 1–1.03)
SQUAMOUS #/AREA URNS HPF: 21 /HPF
TROPONIN I SERPL DL<=0.01 NG/ML-MCNC: 0.02 NG/ML (ref 0–0.03)
URN SPEC COLLECT METH UR: ABNORMAL
UROBILINOGEN UR STRIP-ACNC: ABNORMAL EU/DL
WBC # BLD AUTO: 12.21 K/UL (ref 3.9–12.7)
WBC #/AREA URNS HPF: 7 /HPF (ref 0–5)

## 2024-02-14 PROCEDURE — G0378 HOSPITAL OBSERVATION PER HR: HCPCS

## 2024-02-14 PROCEDURE — 25000003 PHARM REV CODE 250: Performed by: PHYSICIAN ASSISTANT

## 2024-02-14 PROCEDURE — 83036 HEMOGLOBIN GLYCOSYLATED A1C: CPT

## 2024-02-14 PROCEDURE — 99285 EMERGENCY DEPT VISIT HI MDM: CPT | Mod: 25

## 2024-02-14 PROCEDURE — 81000 URINALYSIS NONAUTO W/SCOPE: CPT | Performed by: PHYSICIAN ASSISTANT

## 2024-02-14 PROCEDURE — 84484 ASSAY OF TROPONIN QUANT: CPT

## 2024-02-14 PROCEDURE — 83880 ASSAY OF NATRIURETIC PEPTIDE: CPT

## 2024-02-14 PROCEDURE — 63600175 PHARM REV CODE 636 W HCPCS: Performed by: EMERGENCY MEDICINE

## 2024-02-14 PROCEDURE — 85025 COMPLETE CBC W/AUTO DIFF WBC: CPT

## 2024-02-14 PROCEDURE — 93010 ELECTROCARDIOGRAM REPORT: CPT | Mod: ,,, | Performed by: INTERNAL MEDICINE

## 2024-02-14 PROCEDURE — 96374 THER/PROPH/DIAG INJ IV PUSH: CPT

## 2024-02-14 PROCEDURE — 93005 ELECTROCARDIOGRAM TRACING: CPT

## 2024-02-14 PROCEDURE — 83735 ASSAY OF MAGNESIUM: CPT

## 2024-02-14 PROCEDURE — 63600175 PHARM REV CODE 636 W HCPCS

## 2024-02-14 PROCEDURE — 80053 COMPREHEN METABOLIC PANEL: CPT

## 2024-02-14 RX ORDER — POTASSIUM CHLORIDE 750 MG/1
30 TABLET, EXTENDED RELEASE ORAL ONCE
Status: COMPLETED | OUTPATIENT
Start: 2024-02-14 | End: 2024-02-14

## 2024-02-14 RX ORDER — SODIUM CHLORIDE 0.9 % (FLUSH) 0.9 %
10 SYRINGE (ML) INJECTION
Status: DISCONTINUED | OUTPATIENT
Start: 2024-02-14 | End: 2024-02-16 | Stop reason: HOSPADM

## 2024-02-14 RX ORDER — FUROSEMIDE 10 MG/ML
20 INJECTION INTRAMUSCULAR; INTRAVENOUS EVERY 12 HOURS
Status: DISCONTINUED | OUTPATIENT
Start: 2024-02-15 | End: 2024-02-15

## 2024-02-14 RX ORDER — AMLODIPINE BESYLATE 5 MG/1
10 TABLET ORAL DAILY
Status: DISCONTINUED | OUTPATIENT
Start: 2024-02-15 | End: 2024-02-16 | Stop reason: HOSPADM

## 2024-02-14 RX ORDER — HYDRALAZINE HYDROCHLORIDE 20 MG/ML
10 INJECTION INTRAMUSCULAR; INTRAVENOUS
Status: DISCONTINUED | OUTPATIENT
Start: 2024-02-14 | End: 2024-02-14

## 2024-02-14 RX ORDER — FUROSEMIDE 10 MG/ML
40 INJECTION INTRAMUSCULAR; INTRAVENOUS
Status: COMPLETED | OUTPATIENT
Start: 2024-02-14 | End: 2024-02-14

## 2024-02-14 RX ORDER — TALC
6 POWDER (GRAM) TOPICAL NIGHTLY PRN
Status: DISCONTINUED | OUTPATIENT
Start: 2024-02-14 | End: 2024-02-16 | Stop reason: HOSPADM

## 2024-02-14 RX ORDER — AMOXICILLIN 250 MG
1 CAPSULE ORAL DAILY PRN
Status: DISCONTINUED | OUTPATIENT
Start: 2024-02-14 | End: 2024-02-16 | Stop reason: HOSPADM

## 2024-02-14 RX ORDER — HYDRALAZINE HYDROCHLORIDE 20 MG/ML
10 INJECTION INTRAMUSCULAR; INTRAVENOUS EVERY 6 HOURS PRN
Status: DISCONTINUED | OUTPATIENT
Start: 2024-02-14 | End: 2024-02-16 | Stop reason: HOSPADM

## 2024-02-14 RX ORDER — PANTOPRAZOLE SODIUM 40 MG/1
40 TABLET, DELAYED RELEASE ORAL DAILY
Status: DISCONTINUED | OUTPATIENT
Start: 2024-02-15 | End: 2024-02-16 | Stop reason: HOSPADM

## 2024-02-14 RX ADMIN — POTASSIUM CHLORIDE 30 MEQ: 750 TABLET, EXTENDED RELEASE ORAL at 09:02

## 2024-02-14 RX ADMIN — FUROSEMIDE 40 MG: 10 INJECTION, SOLUTION INTRAVENOUS at 07:02

## 2024-02-14 RX ADMIN — HYDRALAZINE HYDROCHLORIDE 10 MG: 20 INJECTION INTRAMUSCULAR; INTRAVENOUS at 11:02

## 2024-02-14 NOTE — ED TRIAGE NOTES
Pt presents to ED via POV with c/o SOB x several days. States worsens when lying. Denies hx CHF. Pt is hypertensive upon arrival, reports midsternal chest tightness. Has not taken medications today. Denies edema to lower extremities, cough, fever.

## 2024-02-15 PROBLEM — I50.20 SYSTOLIC CONGESTIVE HEART FAILURE: Status: ACTIVE | Noted: 2024-02-14

## 2024-02-15 LAB
ANION GAP SERPL CALC-SCNC: 10 MMOL/L (ref 8–16)
ASCENDING AORTA: 3.27 CM
AV INDEX (PROSTH): 0.69
AV MEAN GRADIENT: 6 MMHG
AV PEAK GRADIENT: 11 MMHG
AV VALVE AREA BY VELOCITY RATIO: 1.9 CM²
AV VALVE AREA: 2.14 CM²
AV VELOCITY RATIO: 0.61
BSA FOR ECHO PROCEDURE: 1.82 M2
BUN SERPL-MCNC: 6 MG/DL (ref 8–23)
CALCIUM SERPL-MCNC: 8.7 MG/DL (ref 8.7–10.5)
CHLORIDE SERPL-SCNC: 107 MMOL/L (ref 95–110)
CHOLEST SERPL-MCNC: 232 MG/DL (ref 120–199)
CHOLEST/HDLC SERPL: 6.8 {RATIO} (ref 2–5)
CO2 SERPL-SCNC: 26 MMOL/L (ref 23–29)
CREAT SERPL-MCNC: 0.8 MG/DL (ref 0.5–1.4)
CV ECHO LV RWT: 0.46 CM
DOP CALC AO PEAK VEL: 1.67 M/S
DOP CALC AO VTI: 29.2 CM
DOP CALC LVOT AREA: 3.1 CM2
DOP CALC LVOT DIAMETER: 1.99 CM
DOP CALC LVOT PEAK VEL: 1.02 M/S
DOP CALC LVOT STROKE VOLUME: 62.48 CM3
DOP CALCLVOT PEAK VEL VTI: 20.1 CM
E WAVE DECELERATION TIME: 159.16 MSEC
E/A RATIO: 1.3
E/E' RATIO: 14.29 M/S
ECHO LV POSTERIOR WALL: 1.13 CM (ref 0.6–1.1)
EST. GFR  (NO RACE VARIABLE): >60 ML/MIN/1.73 M^2
ESTIMATED AVG GLUCOSE: 105 MG/DL (ref 68–131)
FRACTIONAL SHORTENING: 19 % (ref 28–44)
GLUCOSE SERPL-MCNC: 87 MG/DL (ref 70–110)
HBA1C MFR BLD: 5.3 % (ref 4–5.6)
HDLC SERPL-MCNC: 34 MG/DL (ref 40–75)
HDLC SERPL: 14.7 % (ref 20–50)
INTERVENTRICULAR SEPTUM: 1.17 CM (ref 0.6–1.1)
IVC DIAMETER: 2.06 CM
IVRT: 70.41 MSEC
LA MAJOR: 5.8 CM
LA MINOR: 4.91 CM
LA WIDTH: 4.9 CM
LDLC SERPL CALC-MCNC: 174.2 MG/DL (ref 63–159)
LEFT ATRIUM SIZE: 4.53 CM
LEFT ATRIUM VOLUME INDEX: 55.7 ML/M2
LEFT ATRIUM VOLUME: 100.34 CM3
LEFT INTERNAL DIMENSION IN SYSTOLE: 3.95 CM (ref 2.1–4)
LEFT VENTRICLE DIASTOLIC VOLUME INDEX: 62.65 ML/M2
LEFT VENTRICLE DIASTOLIC VOLUME: 112.77 ML
LEFT VENTRICLE MASS INDEX: 118 G/M2
LEFT VENTRICLE SYSTOLIC VOLUME INDEX: 37.8 ML/M2
LEFT VENTRICLE SYSTOLIC VOLUME: 68.07 ML
LEFT VENTRICULAR INTERNAL DIMENSION IN DIASTOLE: 4.9 CM (ref 3.5–6)
LEFT VENTRICULAR MASS: 213.26 G
LV LATERAL E/E' RATIO: 11.11 M/S
LV SEPTAL E/E' RATIO: 20 M/S
LVOT MG: 2.36 MMHG
LVOT MV: 0.72 CM/S
MV PEAK A VEL: 0.77 M/S
MV PEAK E VEL: 1 M/S
MV STENOSIS PRESSURE HALF TIME: 46.16 MS
MV VALVE AREA P 1/2 METHOD: 4.77 CM2
NONHDLC SERPL-MCNC: 198 MG/DL
OHS QRS DURATION: 84 MS
OHS QTC CALCULATION: 447 MS
PISA TR MAX VEL: 2.71 M/S
POTASSIUM SERPL-SCNC: 3.1 MMOL/L (ref 3.5–5.1)
PULM VEIN S/D RATIO: 1.09
PV PEAK D VEL: 0.54 M/S
PV PEAK GRADIENT: 4 MMHG
PV PEAK S VEL: 0.59 M/S
PV PEAK VELOCITY: 0.97 M/S
RA MAJOR: 4.11 CM
RA PRESSURE ESTIMATED: 15 MMHG
RA WIDTH: 3.79 CM
RIGHT VENTRICULAR END-DIASTOLIC DIMENSION: 3.45 CM
RV TB RVSP: 18 MMHG
SARS-COV-2 RDRP RESP QL NAA+PROBE: NEGATIVE
SINUS: 2.97 CM
SODIUM SERPL-SCNC: 143 MMOL/L (ref 136–145)
STJ: 2.27 CM
TDI LATERAL: 0.09 M/S
TDI SEPTAL: 0.05 M/S
TDI: 0.07 M/S
TR MAX PG: 29 MMHG
TRICUSPID ANNULAR PLANE SYSTOLIC EXCURSION: 2.24 CM
TRIGL SERPL-MCNC: 119 MG/DL (ref 30–150)
TV PEAK GRADIENT: 1 MMHG
TV REST PULMONARY ARTERY PRESSURE: 44 MMHG
Z-SCORE OF LEFT VENTRICULAR DIMENSION IN END DIASTOLE: -0.16
Z-SCORE OF LEFT VENTRICULAR DIMENSION IN END SYSTOLE: 1.98

## 2024-02-15 PROCEDURE — 25000003 PHARM REV CODE 250: Performed by: FAMILY MEDICINE

## 2024-02-15 PROCEDURE — 80048 BASIC METABOLIC PNL TOTAL CA: CPT | Performed by: PHYSICIAN ASSISTANT

## 2024-02-15 PROCEDURE — 63600175 PHARM REV CODE 636 W HCPCS: Performed by: FAMILY MEDICINE

## 2024-02-15 PROCEDURE — 36415 COLL VENOUS BLD VENIPUNCTURE: CPT | Performed by: PHYSICIAN ASSISTANT

## 2024-02-15 PROCEDURE — 25000003 PHARM REV CODE 250: Performed by: PHYSICIAN ASSISTANT

## 2024-02-15 PROCEDURE — 21400001 HC TELEMETRY ROOM

## 2024-02-15 PROCEDURE — 80061 LIPID PANEL: CPT | Performed by: PHYSICIAN ASSISTANT

## 2024-02-15 PROCEDURE — 63600175 PHARM REV CODE 636 W HCPCS: Performed by: PHYSICIAN ASSISTANT

## 2024-02-15 PROCEDURE — U0002 COVID-19 LAB TEST NON-CDC: HCPCS | Performed by: FAMILY MEDICINE

## 2024-02-15 RX ORDER — CARVEDILOL 3.12 MG/1
3.12 TABLET ORAL 2 TIMES DAILY
Status: DISCONTINUED | OUTPATIENT
Start: 2024-02-15 | End: 2024-02-16 | Stop reason: HOSPADM

## 2024-02-15 RX ORDER — POTASSIUM CHLORIDE 20 MEQ/1
40 TABLET, EXTENDED RELEASE ORAL 2 TIMES DAILY
Status: DISCONTINUED | OUTPATIENT
Start: 2024-02-15 | End: 2024-02-16 | Stop reason: HOSPADM

## 2024-02-15 RX ORDER — ATORVASTATIN CALCIUM 10 MG/1
20 TABLET, FILM COATED ORAL DAILY
Status: DISCONTINUED | OUTPATIENT
Start: 2024-02-15 | End: 2024-02-16 | Stop reason: HOSPADM

## 2024-02-15 RX ORDER — FUROSEMIDE 10 MG/ML
40 INJECTION INTRAMUSCULAR; INTRAVENOUS EVERY 12 HOURS
Status: DISCONTINUED | OUTPATIENT
Start: 2024-02-15 | End: 2024-02-16 | Stop reason: HOSPADM

## 2024-02-15 RX ORDER — POTASSIUM CHLORIDE 20 MEQ/1
60 TABLET, EXTENDED RELEASE ORAL ONCE
Status: COMPLETED | OUTPATIENT
Start: 2024-02-15 | End: 2024-02-15

## 2024-02-15 RX ADMIN — AMLODIPINE BESYLATE 10 MG: 5 TABLET ORAL at 09:02

## 2024-02-15 RX ADMIN — CARVEDILOL 3.12 MG: 3.12 TABLET, FILM COATED ORAL at 08:02

## 2024-02-15 RX ADMIN — POTASSIUM CHLORIDE 60 MEQ: 1500 TABLET, EXTENDED RELEASE ORAL at 12:02

## 2024-02-15 RX ADMIN — FUROSEMIDE 20 MG: 10 INJECTION, SOLUTION INTRAMUSCULAR; INTRAVENOUS at 09:02

## 2024-02-15 RX ADMIN — POTASSIUM CHLORIDE 40 MEQ: 1500 TABLET, EXTENDED RELEASE ORAL at 08:02

## 2024-02-15 RX ADMIN — FUROSEMIDE 40 MG: 10 INJECTION, SOLUTION INTRAMUSCULAR; INTRAVENOUS at 08:02

## 2024-02-15 NOTE — ASSESSMENT & PLAN NOTE
+Pulmonary edema and effusions on x-ray. BNP 700s.   Continue on IV lasix  pHTN also noted on echo    Results for orders placed during the hospital encounter of 02/14/24    Echo    Interpretation Summary    Left Ventricle: The left ventricle is normal in size. Mildly increased wall thickness. There is concentric hypertrophy. Regional wall motion abnormalities present. There is mildly reduced systolic function with a visually estimated ejection fraction of 40 - 45%.  Anteroseptal hypokinesis.    Right Ventricle: Normal right ventricular cavity size. Systolic function is normal.    Left Atrium: Left atrium is severely dilated.    Mitral Valve: There is mild to moderate regurgitation.    Tricuspid Valve: There is mild regurgitation.    Pulmonary Artery: The estimated pulmonary artery systolic pressure is 44 mmHg.    IVC/SVC: Elevated venous pressure at 15 mmHg.    Added low dose Coreg.

## 2024-02-15 NOTE — ED PROVIDER NOTES
Encounter Date: 2/14/2024    SCRIBE #1 NOTE: I, Nidia Balderas, am scribing for, and in the presence of,  Antonia Lockhart MD. I have scribed the following portions of the note - Other sections scribed: HPI,ROS,PE.       History     Chief Complaint   Patient presents with    Shortness of Breath     69 yo fem to triage for SOB x 2-3 days that's worsening w. Right arm pain. Pt denies CP, N/V/D. VSS, NAD, AAOx4 100% on RA    Arm Pain     Greer Valentin is a 68 y.o. female, with a PMHx of HLD, HTN, peptic ulcer disease and GERD, who presents to the ED with SOB onset 3 days ago. Patient reports feeling SOB when lying down at night. Patient states that the first night she thought something was lodged in her throat but reports waking up at night feeling SOB in the following days. Patient reports she is able to move without feeling SOB(reports changing curtains and doing laundry without issue). Patient reports that she has not taking her antihypertensive medication today(amlodipine) and denies having any known allergies. No other exacerbating or alleviating factors. Denies chest pain, leg swelling or other associated symptoms.      The history is provided by the patient. No  was used.     Review of patient's allergies indicates:   Allergen Reactions    Sinus allergy Nausea Only     Past Medical History:   Diagnosis Date    Gastroesophageal reflux disease 3/4/2019    Hyperlipidemia 3/4/2019    Hypertension     PUD (peptic ulcer disease) 3/4/2019    Tobacco abuse 3/4/2019     Past Surgical History:   Procedure Laterality Date    chin lift      cosmetic surgery - done around 2005?     Family History   Problem Relation Age of Onset    Cancer Neg Hx      Social History     Tobacco Use    Smoking status: Some Days    Smokeless tobacco: Never   Substance Use Topics    Alcohol use: No     Alcohol/week: 0.0 standard drinks of alcohol    Drug use: No     Review of Systems   Constitutional:  Negative for chills  and fever.   HENT:  Negative for congestion and sore throat.    Eyes:  Negative for visual disturbance.   Respiratory:  Positive for shortness of breath. Negative for cough.    Cardiovascular:  Negative for chest pain and leg swelling.   Gastrointestinal:  Negative for abdominal pain, nausea and vomiting.   Genitourinary:  Negative for dysuria.   Skin:  Negative for rash.   Neurological:  Negative for headaches.   Psychiatric/Behavioral:  Negative for decreased concentration.        Physical Exam     Initial Vitals [02/14/24 1222]   BP Pulse Resp Temp SpO2   (!) 186/106 93 18 98.6 °F (37 °C) 100 %      MAP       --         Physical Exam    Nursing note and vitals reviewed.  Constitutional: She appears well-developed and well-nourished. She is not diaphoretic. No distress.   HENT:   Mouth/Throat: Oropharynx is clear and moist.   Eyes: Pupils are equal, round, and reactive to light.   Neck: Neck supple.   Cardiovascular:  Normal rate and regular rhythm.           Pulmonary/Chest: She has rales in the right lower field and the left lower field.   Abdominal: Abdomen is soft. There is no abdominal tenderness.   Musculoskeletal:      Cervical back: Neck supple.      Right lower leg: Pitting Edema (trace) present.      Left lower leg: Edema (trace) present.     Neurological: She is alert and oriented to person, place, and time.   Skin: Skin is warm and dry.   Psychiatric: She has a normal mood and affect.         ED Course   Procedures  Labs Reviewed   CBC W/ AUTO DIFFERENTIAL - Abnormal; Notable for the following components:       Result Value    MCH 25.4 (*)     MCHC 31.0 (*)     Gran # (ANC) 8.1 (*)     All other components within normal limits   COMPREHENSIVE METABOLIC PANEL - Abnormal; Notable for the following components:    Potassium 3.3 (*)     CO2 30 (*)     BUN 6 (*)     ALT 8 (*)     Anion Gap 7 (*)     All other components within normal limits   B-TYPE NATRIURETIC PEPTIDE - Abnormal; Notable for the following  components:     (*)     All other components within normal limits   URINALYSIS, REFLEX TO URINE CULTURE - Abnormal; Notable for the following components:    Appearance, UA Hazy (*)     Protein, UA Trace (*)     Urobilinogen, UA 2.0-3.0 (*)     Leukocytes, UA 1+ (*)     All other components within normal limits    Narrative:     Specimen Source->Urine   URINALYSIS MICROSCOPIC - Abnormal; Notable for the following components:    WBC, UA 7 (*)     All other components within normal limits    Narrative:     Specimen Source->Urine   TROPONIN I   MAGNESIUM   HEMOGLOBIN A1C   MAGNESIUM   SARS-COV-2 RDRP GENE     EKG Readings: (Independently Interpreted)   ECG shows normal sinus rhythm, rate 80 beats per minute, normal P interval,  milliseconds.  The patient has some isolated ST elevation in V2.  No STEMI.       Imaging Results              X-Ray Chest 1 View (Final result)  Result time 02/14/24 13:48:47      Final result by Israle Jacome MD (02/14/24 13:48:47)                   Impression:      1. Small bilateral pleural effusions noting bandlike atelectasis projects over the right lower lung zone.  Interstitial findings may reflect superimposed edema.  Correlation is advised.      Electronically signed by: Israel Jacome MD  Date:    02/14/2024  Time:    13:48               Narrative:    EXAMINATION:  XR CHEST 1 VIEW    CLINICAL HISTORY:  shortness of breath;    TECHNIQUE:  Single frontal view of the chest was performed.    COMPARISON:  None    FINDINGS:  The cardiomediastinal silhouette is not enlarged noting calcification and tortuosity of the aorta..  There is obscuration of the bilateral costophrenic angles suggesting small effusions, right greater than left..  The trachea is deviated rightward by a tortuous aorta..  The lungs are symmetrically expanded bilaterally with coarse interstitial attenuation.  There is bilateral basilar subsegmental atelectasis, right greater than left..  No large focal  consolidation seen.  There is no pneumothorax.  The osseous structures are remarkable for degenerative change..                                       Medications   sodium chloride 0.9% flush 10 mL (has no administration in time range)   furosemide injection 20 mg (has no administration in time range)   senna-docusate 8.6-50 mg per tablet 1 tablet (has no administration in time range)   melatonin tablet 6 mg (has no administration in time range)   pantoprazole EC tablet 40 mg (has no administration in time range)   amLODIPine tablet 10 mg (has no administration in time range)   hydrALAZINE injection 10 mg (10 mg Intravenous Given 2/14/24 2331)   furosemide injection 40 mg (40 mg Intravenous Given 2/14/24 1938)   potassium chloride SA CR tablet 30 mEq (30 mEq Oral Given 2/14/24 2108)     Medical Decision Making  68-year-old female with history of hypertension presents to the emergency department with shortness of breath.  Patient reports orthopnea for the past several nights.  She reports waking up between 3 and 4 in the morning feeling out of breath, gasping for air.  She denies any chest pain.  She denies any history of congestive heart failure.  She has not taken her blood pressure medication today (only on amlodipine).  On exam, the patient is hypertensive, she is trace pitting edema, she is bibasilar rales.  Differential includes but not limited to new onset CHF, hypertensive urgency, symptomatic anemia, dysrhythmia.  Pneumonia URI felt less likely as patient denies other symptoms.  Her workup was initiated at triage with labs and chest x-ray.    Amount and/or Complexity of Data Reviewed  Labs: ordered.     Details: Troponin negative.  BNP is elevated 781.  CMP shows a potassium of 3.3.  Otherwise within acceptable limits.  CBC shows no leukocytosis, normal H&H and platelet count.  Radiology: ordered.     Details: Chest x-ray with small bilateral pleural effusions, suspected superimposed edema.    Risk  OTC  drugs.  Prescription drug management.            Scribe Attestation:   Scribe #1: I performed the above scribed service and the documentation accurately describes the services I performed. I attest to the accuracy of the note.        ED Course as of 02/15/24 0149   Wed Feb 14, 2024   1918 Case reviewed with Dr. Washington (virtual ) and Jonathon Mckoy for placement in observation.  [LH]      ED Course User Index  [LH] Antonia Lockhart MD          I, Antonia Lockhart MD, personally performed the services described in this documentation.  All medical record entries made by the scribe were at my direction and in my presence.  I have reviewed the chart and agree that the record reflects my personal performance and is accurate and complete.      This dictation has been generated using M-Modal Fluency Direct dictation; some phonetic errors may occur.                    Clinical Impression:  Final diagnoses:  [R06.02] Shortness of breath  [I50.9] New onset of congestive heart failure  [I50.9] CHF (congestive heart failure)          ED Disposition Condition    Observation                 Antonia Lockhart MD  02/15/24 0149

## 2024-02-15 NOTE — SUBJECTIVE & OBJECTIVE
Past Medical History:   Diagnosis Date    Gastroesophageal reflux disease 3/4/2019    Hyperlipidemia 3/4/2019    Hypertension     PUD (peptic ulcer disease) 3/4/2019    Tobacco abuse 3/4/2019       Past Surgical History:   Procedure Laterality Date    chin lift      cosmetic surgery - done around 2005?       Review of patient's allergies indicates:   Allergen Reactions    Sinus allergy Nausea Only       No current facility-administered medications on file prior to encounter.     Current Outpatient Medications on File Prior to Encounter   Medication Sig    amLODIPine (NORVASC) 10 MG tablet Take 1 tablet (10 mg total) by mouth once daily.    aspirin (ECOTRIN) 81 MG EC tablet Take 81 mg by mouth once daily.    pantoprazole (PROTONIX) 40 MG tablet Take 1 tablet (40 mg total) by mouth once daily.    [DISCONTINUED] atorvastatin (LIPITOR) 20 MG tablet Take 1 tablet (20 mg total) by mouth once daily.    [DISCONTINUED] cyclobenzaprine (FLEXERIL) 10 MG tablet Take 10 mg by mouth 3 (three) times daily as needed for Muscle spasms.    [DISCONTINUED] diclofenac sodium (VOLTAREN) 1 % Gel Apply 2 g topically 3 (three) times daily as needed.    [DISCONTINUED] lisinopriL 10 MG tablet Take 1 tablet (10 mg total) by mouth once daily.    [DISCONTINUED] nicotine, polacrilex, (NICORETTE) 2 mg Gum Take 1 each (2 mg total) by mouth every 2 (two) hours as needed.     Family History    None       Tobacco Use    Smoking status: Some Days    Smokeless tobacco: Never   Substance and Sexual Activity    Alcohol use: No     Alcohol/week: 0.0 standard drinks of alcohol    Drug use: No    Sexual activity: Never     Review of Systems   Constitutional:  Negative for chills and fever.   HENT:  Negative for nosebleeds and tinnitus.    Eyes:  Negative for photophobia and visual disturbance.   Respiratory:  Positive for shortness of breath. Negative for wheezing.    Cardiovascular:  Negative for chest pain, palpitations and leg swelling.        Orthopnea    Gastrointestinal:  Negative for abdominal distention, nausea and vomiting.   Genitourinary:  Negative for dysuria, flank pain and hematuria.   Musculoskeletal:  Negative for gait problem and joint swelling.   Skin:  Negative for rash and wound.   Neurological:  Negative for seizures and syncope.     Objective:     Vital Signs (Most Recent):  Temp: 98.9 °F (37.2 °C) (02/14/24 1630)  Pulse: 91 (02/14/24 1902)  Resp: 18 (02/14/24 1630)  BP: (!) 172/96 (02/14/24 1902)  SpO2: 97 % (02/14/24 1902) Vital Signs (24h Range):  Temp:  [98.6 °F (37 °C)-98.9 °F (37.2 °C)] 98.9 °F (37.2 °C)  Pulse:  [85-93] 91  Resp:  [18] 18  SpO2:  [96 %-100 %] 97 %  BP: (170-188)/() 172/96     Weight: 76.2 kg (168 lb)  Body mass index is 27.12 kg/m².     Physical Exam  Vitals and nursing note reviewed.   Constitutional:       General: She is not in acute distress.     Appearance: She is well-developed. She is not diaphoretic.   HENT:      Head: Normocephalic and atraumatic.      Right Ear: External ear normal.      Left Ear: External ear normal.   Eyes:      General:         Right eye: No discharge.         Left eye: No discharge.      Conjunctiva/sclera: Conjunctivae normal.   Neck:      Thyroid: No thyromegaly.   Cardiovascular:      Rate and Rhythm: Normal rate and regular rhythm.      Heart sounds: No murmur heard.  Pulmonary:      Effort: Pulmonary effort is normal. No respiratory distress.      Breath sounds: Rales present.      Comments: On RA speaking in full sentences  Abdominal:      General: Bowel sounds are normal. There is no distension.      Palpations: Abdomen is soft. There is no mass.      Tenderness: There is no abdominal tenderness.   Musculoskeletal:         General: No deformity.      Cervical back: Normal range of motion and neck supple.      Right lower leg: No edema.      Left lower leg: No edema.   Skin:     General: Skin is warm and dry.   Neurological:      Mental Status: She is alert and oriented to person,  place, and time.      Sensory: No sensory deficit.   Psychiatric:         Mood and Affect: Mood normal.         Behavior: Behavior normal.                Significant Labs: CBC:   Recent Labs   Lab 02/14/24  1716   WBC 12.21   HGB 12.6   HCT 40.6        CMP:   Recent Labs   Lab 02/14/24  1716      K 3.3*      CO2 30*   GLU 85   BUN 6*   CREATININE 0.8   CALCIUM 9.0   PROT 7.5   ALBUMIN 3.7   BILITOT 0.5   ALKPHOS 126   AST 10   ALT 8*   ANIONGAP 7*     Cardiac Markers:   Recent Labs   Lab 02/14/24  1716   *     Troponin:   Recent Labs   Lab 02/14/24  1716   TROPONINI 0.016       Significant Imaging:   Imaging Results              X-Ray Chest 1 View (Final result)  Result time 02/14/24 13:48:47      Final result by Israel Jacome MD (02/14/24 13:48:47)                   Impression:      1. Small bilateral pleural effusions noting bandlike atelectasis projects over the right lower lung zone.  Interstitial findings may reflect superimposed edema.  Correlation is advised.      Electronically signed by: Israel Jacome MD  Date:    02/14/2024  Time:    13:48               Narrative:    EXAMINATION:  XR CHEST 1 VIEW    CLINICAL HISTORY:  shortness of breath;    TECHNIQUE:  Single frontal view of the chest was performed.    COMPARISON:  None    FINDINGS:  The cardiomediastinal silhouette is not enlarged noting calcification and tortuosity of the aorta..  There is obscuration of the bilateral costophrenic angles suggesting small effusions, right greater than left..  The trachea is deviated rightward by a tortuous aorta..  The lungs are symmetrically expanded bilaterally with coarse interstitial attenuation.  There is bilateral basilar subsegmental atelectasis, right greater than left..  No large focal consolidation seen.  There is no pneumothorax.  The osseous structures are remarkable for degenerative change..

## 2024-02-15 NOTE — ADMISSIONCARE
AdmissionCare    Guideline: Heart Failure - INPT, Inpatient    Based on the indications selected for the patient, the bed status of Inpatient was determined to be NOT MET    The following indications were selected as present at the time of evaluation of the patient:       AdmissionCare documentation entered by: HAYLIE Washington    [facility], 27th edition, Copyright © 2023 [facility], [facility] All Rights Reserved.  [id number]

## 2024-02-15 NOTE — NURSING
[facility] [place]  Nurses Note -- 4 Eyes      2/15/2024       Skin assessed on: Q Shift      [x] No Pressure Injuries Present    [x]Prevention Measures Documented    [] Yes LDA  for Pressure Injury Previously documented     [] Yes New Pressure Injury Discovered   [] LDA for New Pressure Injury Added      Attending RN:  Rachel Gallego RN     Second RN:  SIGRID Quinteros

## 2024-02-15 NOTE — PLAN OF CARE
02/15/24 0951   Discharge Planning   Assessment Type Discharge Planning Brief Assessment   Resource/Environmental Concerns none   Support Systems Family members;Children   Equipment Currently Used at Home none   Current Living Arrangements home   Patient/Family Anticipates Transition to home   Patient/Family Anticipated Services at Transition none   DME Needed Upon Discharge  none   Discharge Plan A Home       [facility] [address] [place] - [address]  [address]  [place] [address]  Phone: [phone] Fax: [phone]

## 2024-02-15 NOTE — ASSESSMENT & PLAN NOTE
Smokes cigarettes rarely. Counseled on cessation. Greater than 3min spent counseling patient on dangers of continued tobacco abuse.

## 2024-02-15 NOTE — CONSULTS
Food & Nutrition  Education    Diet Education: Fluid and salt education  Time Spent: 15 minutes  Learners: pt and family member       Nutrition Education provided with handouts: CHF education      Comments: Touched on 2300 mg Na recommendations. Pt's family member stated that pt doesn't each much to being with. Left handout at bedside. Will re-consult as needed.       All questions and concerns answered. Dietitian's contact information provided.       Follow-Up: 1x/weekly    Please Re-consult as needed        Thanks!

## 2024-02-15 NOTE — PROGRESS NOTES
[facility] Medicine  Progress Note    Patient Name: Greer Valentin  MRN: [id number]  Patient Class: IP- Inpatient   Admission Date: 2/14/2024  Length of Stay: 0 days  Attending Physician: Va Castrejon MD  Primary Care Provider: Daniel Padgett MD      Subjective:     Principal Problem:Systolic congestive heart failure      HPI:  Greer Valentin 68 y.o. female with HTN and tobacco abuse presents to the hospital chief complaint of shortness a breath.  She reports 2-3 weeks of intermittent shortness breath that worsens with lying flat and improves with sitting upright.  She was attempted no treatments home.  She she denies any history of heart failure.  She does admit she was not been taking her home amlodipine as prescribed for hypertension.  She denies any other daily home medications.  She denies fever chest pain nausea vomiting abdominal pain leg swelling melena hematuria hematemesis dizziness and syncope.    In the ED, afebrile without leukocytosis initial blood pressure 186/100 chest x-ray with small bilateral pleural effusions and interstitial findings may reflect pulmonary edema .    Overview/Hospital Course:  No notes on file    Interval History: NAEON. Feels better with diuresis. Has not been taking her BP meds for unclear reasons.     Review of Systems   Constitutional:  Negative for chills and fever.   Respiratory:  Negative for shortness of breath.    Cardiovascular:  Negative for chest pain.   Gastrointestinal:  Negative for abdominal pain.   Genitourinary:  Negative for dysuria.   Neurological:  Negative for headaches.   Psychiatric/Behavioral:  Negative for confusion.      Objective:     Vital Signs (Most Recent):  Temp: 99.4 °F (37.4 °C) (02/15/24 1601)  Pulse: 102 (02/15/24 1601)  Resp: 19 (02/15/24 1601)  BP: (!) 152/79 (02/15/24 1601)  SpO2: 98 % (02/15/24 1601) Vital Signs (24h Range):  Temp:  [98.8 °F (37.1 °C)-99.4 °F (37.4 °C)] 99.4 °F (37.4 °C)  Pulse:  []  102  Resp:  [18-21] 19  SpO2:  [93 %-100 %] 98 %  BP: (129-188)/() 152/79     Weight: 71.2 kg (156 lb 15.5 oz)  Body mass index is 25.34 kg/m².    Intake/Output Summary (Last 24 hours) at 2/15/2024 1657  Last data filed at 2/15/2024 1246  Gross per 24 hour   Intake 340 ml   Output 800 ml   Net -460 ml         Physical Exam  Vitals and nursing note reviewed.   Constitutional:       General: She is not in acute distress.     Appearance: She is well-developed.   HENT:      Head: Normocephalic and atraumatic.   Eyes:      Conjunctiva/sclera: Conjunctivae normal.   Neck:      Vascular: JVD present.   Cardiovascular:      Rate and Rhythm: Normal rate and regular rhythm.      Heart sounds: Normal heart sounds.   Pulmonary:      Effort: Pulmonary effort is normal.      Breath sounds: Normal breath sounds.   Abdominal:      General: Bowel sounds are normal. There is no distension.      Palpations: Abdomen is soft.      Tenderness: There is no abdominal tenderness.   Musculoskeletal:      Cervical back: Neck supple.      Right lower leg: No edema.      Left lower leg: No edema.   Neurological:      Mental Status: She is alert.   Psychiatric:         Behavior: Behavior normal.             Significant Labs: All pertinent labs within the past 24 hours have been reviewed.  BMP:   Recent Labs   Lab 02/14/24  1716 02/15/24  0450   GLU 85 87    143   K 3.3* 3.1*    107   CO2 30* 26   BUN 6* 6*   CREATININE 0.8 0.8   CALCIUM 9.0 8.7   MG 1.6  --      CBC:   Recent Labs   Lab 02/14/24  1716   WBC 12.21   HGB 12.6   HCT 40.6          Significant Imaging: I have reviewed all pertinent imaging results/findings within the past 24 hours.    Assessment/Plan:      * Systolic congestive heart failure  +Pulmonary edema and effusions on x-ray. BNP 700s.   Continue on IV lasix  pHTN also noted on echo    Results for orders placed during the hospital encounter of 02/14/24    Echo    Interpretation Summary    Left  Ventricle: The left ventricle is normal in size. Mildly increased wall thickness. There is concentric hypertrophy. Regional wall motion abnormalities present. There is mildly reduced systolic function with a visually estimated ejection fraction of 40 - 45%.  Anteroseptal hypokinesis.    Right Ventricle: Normal right ventricular cavity size. Systolic function is normal.    Left Atrium: Left atrium is severely dilated.    Mitral Valve: There is mild to moderate regurgitation.    Tricuspid Valve: There is mild regurgitation.    Pulmonary Artery: The estimated pulmonary artery systolic pressure is 44 mmHg.    IVC/SVC: Elevated venous pressure at 15 mmHg.    Added low dose Coreg.       Hypokalemia  Continue oral replacement  Repeat in a.m.    PUD (peptic ulcer disease)  Per chart review. Denies abdominal pain. Continue home protonix      Hyperlipidemia  Lab Results   Component Value Date    LDLCALC 174.2 (H) 02/15/2024     Not currently on statin outpatient. Start atorvastatin.     Essential hypertension, benign  Chronic, uncontrolled. Latest blood pressure and vitals reviewed-     Temp:  [98.8 °F (37.1 °C)-99.4 °F (37.4 °C)]   Pulse:  []   Resp:  [18-21]   BP: (129-188)/()   SpO2:  [93 %-100 %] .   Home meds for hypertension were reviewed and noted below.   Hypertension Medications               amLODIPine (NORVASC) 10 MG tablet Take 1 tablet (10 mg total) by mouth once daily.            While in the hospital, will manage blood pressure as follows; Continue home antihypertensive regimen; IV lasix and coreg added    Will utilize p.r.n. blood pressure medication only if patient's blood pressure greater than 180/110 and she develops symptoms such as worsening chest pain or shortness of breath.    Tobacco abuse  Counseled on cessation. Greater than 3min spent counseling patient on dangers of continued tobacco abuse.       VTE Risk Mitigation (From admission, onward)           Ordered     IP VTE HIGH RISK PATIENT   Once         02/14/24 1925     Place sequential compression device  Until discontinued         02/14/24 1925     Place LEATHA hose  Until discontinued         02/14/24 1925                    Discharge Planning   DENISSE:      Code Status: Full Code   Is the patient medically ready for discharge?:     Reason for patient still in hospital (select all that apply): Patient trending condition and Treatment  Discharge Plan A: Home          Va Castrejon MD  Department of [facility] Medicine   [facility]

## 2024-02-15 NOTE — NURSING
[facility] [place]      2/14/2024       Skin assessed on: Admit      [x] No Pressure Injuries Present    [x]Prevention Measures Documented    [] Yes LDA  for Pressure Injury Previously documented     [] Yes New Pressure Injury Discovered   [] LDA for New Pressure Injury Added      Attending RN:  Neli Maldonado RN     Second RN:  SIGRID Holder

## 2024-02-15 NOTE — SUBJECTIVE & OBJECTIVE
Interval History: NAEON. Feels better with diuresis. Has not been taking her BP meds for unclear reasons.     Review of Systems   Constitutional:  Negative for chills and fever.   Respiratory:  Negative for shortness of breath.    Cardiovascular:  Negative for chest pain.   Gastrointestinal:  Negative for abdominal pain.   Genitourinary:  Negative for dysuria.   Neurological:  Negative for headaches.   Psychiatric/Behavioral:  Negative for confusion.      Objective:     Vital Signs (Most Recent):  Temp: 99.4 °F (37.4 °C) (02/15/24 1601)  Pulse: 102 (02/15/24 1601)  Resp: 19 (02/15/24 1601)  BP: (!) 152/79 (02/15/24 1601)  SpO2: 98 % (02/15/24 1601) Vital Signs (24h Range):  Temp:  [98.8 °F (37.1 °C)-99.4 °F (37.4 °C)] 99.4 °F (37.4 °C)  Pulse:  [] 102  Resp:  [18-21] 19  SpO2:  [93 %-100 %] 98 %  BP: (129-188)/() 152/79     Weight: 71.2 kg (156 lb 15.5 oz)  Body mass index is 25.34 kg/m².    Intake/Output Summary (Last 24 hours) at 2/15/2024 1657  Last data filed at 2/15/2024 1246  Gross per 24 hour   Intake 340 ml   Output 800 ml   Net -460 ml         Physical Exam  Vitals and nursing note reviewed.   Constitutional:       General: She is not in acute distress.     Appearance: She is well-developed.   HENT:      Head: Normocephalic and atraumatic.   Eyes:      Conjunctiva/sclera: Conjunctivae normal.   Neck:      Vascular: JVD present.   Cardiovascular:      Rate and Rhythm: Normal rate and regular rhythm.      Heart sounds: Normal heart sounds.   Pulmonary:      Effort: Pulmonary effort is normal.      Breath sounds: Normal breath sounds.   Abdominal:      General: Bowel sounds are normal. There is no distension.      Palpations: Abdomen is soft.      Tenderness: There is no abdominal tenderness.   Musculoskeletal:      Cervical back: Neck supple.      Right lower leg: No edema.      Left lower leg: No edema.   Neurological:      Mental Status: She is alert.   Psychiatric:         Behavior: Behavior  normal.             Significant Labs: All pertinent labs within the past 24 hours have been reviewed.  BMP:   Recent Labs   Lab 02/14/24  1716 02/15/24  0450   GLU 85 87    143   K 3.3* 3.1*    107   CO2 30* 26   BUN 6* 6*   CREATININE 0.8 0.8   CALCIUM 9.0 8.7   MG 1.6  --      CBC:   Recent Labs   Lab 02/14/24  1716   WBC 12.21   HGB 12.6   HCT 40.6          Significant Imaging: I have reviewed all pertinent imaging results/findings within the past 24 hours.

## 2024-02-15 NOTE — NURSING
PER handoff received from SIGRID Quinteros     Pt resting in bed quietly. NAD noted. No c/o pain.     Fall and safety precautions maintained. Bed alarm activated and audible.. Bed locked in lowest position, with side rails up x2. Call bell and personal items within reach

## 2024-02-15 NOTE — ASSESSMENT & PLAN NOTE
Chronic, uncontrolled. Latest blood pressure and vitals reviewed-     Temp:  [98.6 °F (37 °C)-98.9 °F (37.2 °C)]   Pulse:  [85-93]   Resp:  [18]   BP: (170-188)/()   SpO2:  [96 %-100 %] .   Home meds for hypertension were reviewed and noted below.   Hypertension Medications               amLODIPine (NORVASC) 10 MG tablet Take 1 tablet (10 mg total) by mouth once daily.            While in the hospital, will manage blood pressure as follows; Continue home antihypertensive regimen    Will utilize p.r.n. blood pressure medication only if patient's blood pressure greater than 180/110 and she develops symptoms such as worsening chest pain or shortness of breath.

## 2024-02-15 NOTE — ASSESSMENT & PLAN NOTE
Lab Results   Component Value Date    LDLCALC 174.2 (H) 02/15/2024     Not currently on statin outpatient. Start atorvastatin.

## 2024-02-15 NOTE — NURSING
Pt arrive to the unit by bed accompanied by transport. Assisted pt to bed. Tele monitoring initiated.  Admit assessment initiated.  Pt is AAO.  NO distress noted.

## 2024-02-15 NOTE — HPI
Greer Valentin 68 y.o. female with HTN and tobacco abuse presents to the hospital chief complaint of shortness a breath.  She reports 2-3 weeks of intermittent shortness breath that worsens with lying flat and improves with sitting upright.  She was attempted no treatments home.  She she denies any history of heart failure.  She does admit she was not been taking her home amlodipine as prescribed for hypertension.  She denies any other daily home medications.  She denies fever chest pain nausea vomiting abdominal pain leg swelling melena hematuria hematemesis dizziness and syncope.    In the ED, afebrile without leukocytosis initial blood pressure 186/100 chest x-ray with small bilateral pleural effusions and interstitial findings may reflect pulmonary edema .

## 2024-02-15 NOTE — ASSESSMENT & PLAN NOTE
Counseled on cessation. Greater than 3min spent counseling patient on dangers of continued tobacco abuse.

## 2024-02-15 NOTE — ASSESSMENT & PLAN NOTE
Chronic, uncontrolled. Latest blood pressure and vitals reviewed-     Temp:  [98.8 °F (37.1 °C)-99.4 °F (37.4 °C)]   Pulse:  []   Resp:  [18-21]   BP: (129-188)/()   SpO2:  [93 %-100 %] .   Home meds for hypertension were reviewed and noted below.   Hypertension Medications               amLODIPine (NORVASC) 10 MG tablet Take 1 tablet (10 mg total) by mouth once daily.            While in the hospital, will manage blood pressure as follows; Continue home antihypertensive regimen; IV lasix and coreg added    Will utilize p.r.n. blood pressure medication only if patient's blood pressure greater than 180/110 and she develops symptoms such as worsening chest pain or shortness of breath.

## 2024-02-15 NOTE — H&P
[facility]  [facility] Medicine  History & Physical    Patient Name: Greer Valentin  MRN: [id number]  Patient Class: OP- Observation  Admission Date: 2/14/2024  Attending Physician: Eddie Crenshaw MD   Primary Care Provider: Daniel Padgett MD         Patient information was obtained from patient, past medical records, and ER records.     Subjective:     Principal Problem:Pulmonary edema    Chief Complaint:   Chief Complaint   Patient presents with    Shortness of Breath     67 yo fem to triage for SOB x 2-3 days that's worsening w. Right arm pain. Pt denies CP, N/V/D. VSS, NAD, AAOx4 100% on RA    Arm Pain        HPI: Greer Valentin 68 y.o. female with HTN and tobacco abuse presents to the hospital chief complaint of shortness a breath.  She reports 2-3 weeks of intermittent shortness breath that worsens with lying flat and improves with sitting upright.  She was attempted no treatments home.  She she denies any history of heart failure.  She does admit she was not been taking her home amlodipine as prescribed for hypertension.  She denies any other daily home medications.  She denies fever chest pain nausea vomiting abdominal pain leg swelling melena hematuria hematemesis dizziness and syncope.    In the ED, afebrile without leukocytosis initial blood pressure 186/100 chest x-ray with small bilateral pleural effusions and interstitial findings may reflect pulmonary edema .    Past Medical History:   Diagnosis Date    Gastroesophageal reflux disease 3/4/2019    Hyperlipidemia 3/4/2019    Hypertension     PUD (peptic ulcer disease) 3/4/2019    Tobacco abuse 3/4/2019       Past Surgical History:   Procedure Laterality Date    chin lift      cosmetic surgery - done around 2005?       Review of patient's allergies indicates:   Allergen Reactions    Sinus allergy Nausea Only       No current facility-administered medications on file prior to encounter.     Current Outpatient Medications on File Prior  to Encounter   Medication Sig    amLODIPine (NORVASC) 10 MG tablet Take 1 tablet (10 mg total) by mouth once daily.    aspirin (ECOTRIN) 81 MG EC tablet Take 81 mg by mouth once daily.    pantoprazole (PROTONIX) 40 MG tablet Take 1 tablet (40 mg total) by mouth once daily.    [DISCONTINUED] atorvastatin (LIPITOR) 20 MG tablet Take 1 tablet (20 mg total) by mouth once daily.    [DISCONTINUED] cyclobenzaprine (FLEXERIL) 10 MG tablet Take 10 mg by mouth 3 (three) times daily as needed for Muscle spasms.    [DISCONTINUED] diclofenac sodium (VOLTAREN) 1 % Gel Apply 2 g topically 3 (three) times daily as needed.    [DISCONTINUED] lisinopriL 10 MG tablet Take 1 tablet (10 mg total) by mouth once daily.    [DISCONTINUED] nicotine, polacrilex, (NICORETTE) 2 mg Gum Take 1 each (2 mg total) by mouth every 2 (two) hours as needed.     Family History    None       Tobacco Use    Smoking status: Some Days    Smokeless tobacco: Never   Substance and Sexual Activity    Alcohol use: No     Alcohol/week: 0.0 standard drinks of alcohol    Drug use: No    Sexual activity: Never     Review of Systems   Constitutional:  Negative for chills and fever.   HENT:  Negative for nosebleeds and tinnitus.    Eyes:  Negative for photophobia and visual disturbance.   Respiratory:  Positive for shortness of breath. Negative for wheezing.    Cardiovascular:  Negative for chest pain, palpitations and leg swelling.        Orthopnea   Gastrointestinal:  Negative for abdominal distention, nausea and vomiting.   Genitourinary:  Negative for dysuria, flank pain and hematuria.   Musculoskeletal:  Negative for gait problem and joint swelling.   Skin:  Negative for rash and wound.   Neurological:  Negative for seizures and syncope.     Objective:     Vital Signs (Most Recent):  Temp: 98.9 °F (37.2 °C) (02/14/24 1630)  Pulse: 91 (02/14/24 1902)  Resp: 18 (02/14/24 1630)  BP: (!) 172/96 (02/14/24 1902)  SpO2: 97 % (02/14/24 1902) Vital Signs (24h Range):  Temp:   [98.6 °F (37 °C)-98.9 °F (37.2 °C)] 98.9 °F (37.2 °C)  Pulse:  [85-93] 91  Resp:  [18] 18  SpO2:  [96 %-100 %] 97 %  BP: (170-188)/() 172/96     Weight: 76.2 kg (168 lb)  Body mass index is 27.12 kg/m².     Physical Exam  Vitals and nursing note reviewed.   Constitutional:       General: She is not in acute distress.     Appearance: She is well-developed. She is not diaphoretic.   HENT:      Head: Normocephalic and atraumatic.      Right Ear: External ear normal.      Left Ear: External ear normal.   Eyes:      General:         Right eye: No discharge.         Left eye: No discharge.      Conjunctiva/sclera: Conjunctivae normal.   Neck:      Thyroid: No thyromegaly.   Cardiovascular:      Rate and Rhythm: Normal rate and regular rhythm.      Heart sounds: No murmur heard.  Pulmonary:      Effort: Pulmonary effort is normal. No respiratory distress.      Breath sounds: Rales present.      Comments: On RA speaking in full sentences  Abdominal:      General: Bowel sounds are normal. There is no distension.      Palpations: Abdomen is soft. There is no mass.      Tenderness: There is no abdominal tenderness.   Musculoskeletal:         General: No deformity.      Cervical back: Normal range of motion and neck supple.      Right lower leg: No edema.      Left lower leg: No edema.   Skin:     General: Skin is warm and dry.   Neurological:      Mental Status: She is alert and oriented to person, place, and time.      Sensory: No sensory deficit.   Psychiatric:         Mood and Affect: Mood normal.         Behavior: Behavior normal.                Significant Labs: CBC:   Recent Labs   Lab 02/14/24  1716   WBC 12.21   HGB 12.6   HCT 40.6        CMP:   Recent Labs   Lab 02/14/24  1716      K 3.3*      CO2 30*   GLU 85   BUN 6*   CREATININE 0.8   CALCIUM 9.0   PROT 7.5   ALBUMIN 3.7   BILITOT 0.5   ALKPHOS 126   AST 10   ALT 8*   ANIONGAP 7*     Cardiac Markers:   Recent Labs   Lab 02/14/24  1716    *     Troponin:   Recent Labs   Lab 02/14/24  1716   TROPONINI 0.016       Significant Imaging:   Imaging Results              X-Ray Chest 1 View (Final result)  Result time 02/14/24 13:48:47      Final result by Israel Jacome MD (02/14/24 13:48:47)                   Impression:      1. Small bilateral pleural effusions noting bandlike atelectasis projects over the right lower lung zone.  Interstitial findings may reflect superimposed edema.  Correlation is advised.      Electronically signed by: Israel Jacome MD  Date:    02/14/2024  Time:    13:48               Narrative:    EXAMINATION:  XR CHEST 1 VIEW    CLINICAL HISTORY:  shortness of breath;    TECHNIQUE:  Single frontal view of the chest was performed.    COMPARISON:  None    FINDINGS:  The cardiomediastinal silhouette is not enlarged noting calcification and tortuosity of the aorta..  There is obscuration of the bilateral costophrenic angles suggesting small effusions, right greater than left..  The trachea is deviated rightward by a tortuous aorta..  The lungs are symmetrically expanded bilaterally with coarse interstitial attenuation.  There is bilateral basilar subsegmental atelectasis, right greater than left..  No large focal consolidation seen.  There is no pneumothorax.  The osseous structures are remarkable for degenerative change..                                      Assessment/Plan:     * Pulmonary edema  Presents with SOB and orthopnea for multiple weeks. Suspect related to HTN vs CHF. Admits non-compliance with home BP meds. Pulmonary edema and effusions on x-ray. BNP 700s.   Started on IV lasix  Echo pending  Telemetry  Troponin trend  Check UA for protein  Will continue home amlodipine, should echo confirm CHF transition to BB/ARB      Hypokalemia  Potassium 3.3 on arrival. Will supplement with PO and check Mg.     PUD (peptic ulcer disease)  Per chart review. Denies abdominal pain. Continue home  protonix      Hyperlipidemia  Lab Results   Component Value Date    LDLCALC 188.8 (H) 02/12/2020     Not currently on statin outpatient. Will repeat    Essential hypertension, benign  Chronic, uncontrolled. Latest blood pressure and vitals reviewed-     Temp:  [98.6 °F (37 °C)-98.9 °F (37.2 °C)]   Pulse:  [85-93]   Resp:  [18]   BP: (170-188)/()   SpO2:  [96 %-100 %] .   Home meds for hypertension were reviewed and noted below.   Hypertension Medications               amLODIPine (NORVASC) 10 MG tablet Take 1 tablet (10 mg total) by mouth once daily.            While in the hospital, will manage blood pressure as follows; Continue home antihypertensive regimen    Will utilize p.r.n. blood pressure medication only if patient's blood pressure greater than 180/110 and she develops symptoms such as worsening chest pain or shortness of breath.    Tobacco abuse  Smokes cigarettes rarely. Counseled on cessation. Greater than 3min spent counseling patient on dangers of continued tobacco abuse.       VTE Risk Mitigation (From admission, onward)           Ordered     IP VTE HIGH RISK PATIENT  Once         02/14/24 1925     Place sequential compression device  Until discontinued         02/14/24 1925     Place LEATHA hose  Until discontinued         02/14/24 1925                     Discussed with ED physician.    VTE: LEATHA/SCD  Code: full  Diet: cardiac  Dispo: pending diuresis and echo    On 02/14/2024, patient should be placed in hospital observation services under my care in collaboration with Eddie Crenshaw MD.      AdmissionCare    Guideline: Heart Failure - INPT, Inpatient    Based on the indications selected for the patient, the bed status of Inpatient was determined to be NOT MET    The following indications were selected as present at the time of evaluation of the patient:       AdmissionCare documentation entered by: HAYLIE Washington    [facility] [employer], 27th edition, Copyright © 2023 [facility] [employer] All Rights  Reserved.  [id number]    Jonathon Mckoy PA-C  Department of Hospital Medicine  [facility] - Emergency Dept

## 2024-02-15 NOTE — ASSESSMENT & PLAN NOTE
Lab Results   Component Value Date    LDLCALC 188.8 (H) 02/12/2020     Not currently on statin outpatient. Will repeat

## 2024-02-15 NOTE — ASSESSMENT & PLAN NOTE
Presents with SOB and orthopnea for multiple weeks. Suspect related to HTN vs CHF. Admits non-compliance with home BP meds. Pulmonary edema and effusions on x-ray. BNP 700s.   Started on IV lasix  Echo pending  Telemetry  Troponin trend  Check UA for protein  Will continue home amlodipine, should echo confirm CHF transition to BB/ARB

## 2024-02-16 VITALS
RESPIRATION RATE: 18 BRPM | TEMPERATURE: 99 F | OXYGEN SATURATION: 95 % | SYSTOLIC BLOOD PRESSURE: 131 MMHG | HEART RATE: 86 BPM | WEIGHT: 156.94 LBS | HEIGHT: 66 IN | DIASTOLIC BLOOD PRESSURE: 78 MMHG | BODY MASS INDEX: 25.22 KG/M2

## 2024-02-16 LAB
ANION GAP SERPL CALC-SCNC: 10 MMOL/L (ref 8–16)
BUN SERPL-MCNC: 11 MG/DL (ref 8–23)
CALCIUM SERPL-MCNC: 9.2 MG/DL (ref 8.7–10.5)
CHLORIDE SERPL-SCNC: 107 MMOL/L (ref 95–110)
CO2 SERPL-SCNC: 25 MMOL/L (ref 23–29)
CREAT SERPL-MCNC: 0.9 MG/DL (ref 0.5–1.4)
EST. GFR  (NO RACE VARIABLE): >60 ML/MIN/1.73 M^2
GLUCOSE SERPL-MCNC: 112 MG/DL (ref 70–110)
POTASSIUM SERPL-SCNC: 4 MMOL/L (ref 3.5–5.1)
SODIUM SERPL-SCNC: 142 MMOL/L (ref 136–145)

## 2024-02-16 PROCEDURE — 94761 N-INVAS EAR/PLS OXIMETRY MLT: CPT

## 2024-02-16 PROCEDURE — 80048 BASIC METABOLIC PNL TOTAL CA: CPT | Performed by: PHYSICIAN ASSISTANT

## 2024-02-16 PROCEDURE — 25000003 PHARM REV CODE 250: Performed by: FAMILY MEDICINE

## 2024-02-16 PROCEDURE — 25000003 PHARM REV CODE 250: Performed by: PHYSICIAN ASSISTANT

## 2024-02-16 PROCEDURE — 36415 COLL VENOUS BLD VENIPUNCTURE: CPT | Performed by: PHYSICIAN ASSISTANT

## 2024-02-16 PROCEDURE — 63600175 PHARM REV CODE 636 W HCPCS: Performed by: FAMILY MEDICINE

## 2024-02-16 RX ORDER — ATORVASTATIN CALCIUM 20 MG/1
20 TABLET, FILM COATED ORAL DAILY
Qty: 30 TABLET | Refills: 0 | Status: SHIPPED | OUTPATIENT
Start: 2024-02-17 | End: 2025-02-16

## 2024-02-16 RX ORDER — FUROSEMIDE 40 MG/1
40 TABLET ORAL DAILY
Qty: 30 TABLET | Refills: 0 | Status: SHIPPED | OUTPATIENT
Start: 2024-02-16 | End: 2025-02-15

## 2024-02-16 RX ORDER — POTASSIUM CHLORIDE 20 MEQ/1
40 TABLET, EXTENDED RELEASE ORAL EVERY OTHER DAY
Qty: 30 TABLET | Refills: 0 | Status: SHIPPED | OUTPATIENT
Start: 2024-02-16

## 2024-02-16 RX ORDER — CARVEDILOL 3.12 MG/1
3.12 TABLET ORAL 2 TIMES DAILY
Qty: 60 TABLET | Refills: 0 | Status: SHIPPED | OUTPATIENT
Start: 2024-02-16 | End: 2025-02-15

## 2024-02-16 RX ADMIN — CARVEDILOL 3.12 MG: 3.12 TABLET, FILM COATED ORAL at 08:02

## 2024-02-16 RX ADMIN — POTASSIUM CHLORIDE 40 MEQ: 1500 TABLET, EXTENDED RELEASE ORAL at 08:02

## 2024-02-16 RX ADMIN — PANTOPRAZOLE SODIUM 40 MG: 40 TABLET, DELAYED RELEASE ORAL at 08:02

## 2024-02-16 RX ADMIN — FUROSEMIDE 40 MG: 10 INJECTION, SOLUTION INTRAMUSCULAR; INTRAVENOUS at 08:02

## 2024-02-16 RX ADMIN — AMLODIPINE BESYLATE 10 MG: 5 TABLET ORAL at 08:02

## 2024-02-16 RX ADMIN — ATORVASTATIN CALCIUM 20 MG: 10 TABLET, FILM COATED ORAL at 08:02

## 2024-02-16 NOTE — NURSING
Discharge Preparation:  Note that patient awake, alert and fully oriented.  Patient dressed and ready for discharge.   Case management coordination of service complete.    IV and telemetry monitor removed with no bleeding to IV site.     Will contact Virtual RN for discharge education with patient.     Will contact Hospital Transport for w/c to parking area.

## 2024-02-16 NOTE — NURSING
[facility]  Nurses Note -- 4 Eyes      2/16/2024       Skin assessed on: Q Shift      [x] No Pressure Injuries Present    [x]Prevention Measures Documented    [] Yes LDA  for Pressure Injury Previously documented     [] Yes New Pressure Injury Discovered   [] LDA for New Pressure Injury Added      Attending RN:  Louisa Parra RN     Second RN:  ZION Hernandez

## 2024-02-16 NOTE — PLAN OF CARE
Case Management Final Discharge Note      Discharge Disposition: home    New DME ordered / company name: n/a    Relevant SDOH / Transition of Care Barriers:  none    Primary Caretaker and contact information: n/a    Scheduled followup appointment: on AVS    Referrals placed: per MD    Transportation: private vehicle        Patient and family educated on discharge services and updated on DC plan. Bedside RNLouisa notified via message, patient clear to discharge from Case Management Perspective.        02/16/24 1000   Final Note   Assessment Type Final Discharge Note   Anticipated Discharge Disposition Home   Hospital Resources/Appts/Education Provided Appointments scheduled and added to AVS   Post-Acute Status   Post-Acute Authorization Other   Other Status No Post-Acute Service Needs   Discharge Delays None known at this time

## 2024-02-19 NOTE — ASSESSMENT & PLAN NOTE
+Pulmonary edema and effusions on x-ray. BNP 700s.   pHTN also noted on echo    Results for orders placed during the hospital encounter of 02/14/24    Echo    Interpretation Summary    Left Ventricle: The left ventricle is normal in size. Mildly increased wall thickness. There is concentric hypertrophy. Regional wall motion abnormalities present. There is mildly reduced systolic function with a visually estimated ejection fraction of 40 - 45%.  Anteroseptal hypokinesis.    Right Ventricle: Normal right ventricular cavity size. Systolic function is normal.    Left Atrium: Left atrium is severely dilated.    Mitral Valve: There is mild to moderate regurgitation.    Tricuspid Valve: There is mild regurgitation.    Pulmonary Artery: The estimated pulmonary artery systolic pressure is 44 mmHg.    IVC/SVC: Elevated venous pressure at 15 mmHg.    Started low dose Coreg. Continue.

## 2024-02-19 NOTE — HOSPITAL COURSE
She was given IV Lasix. Her echo revealed mild systolic CHF and pulmonary hypertension. She felt significantly better with diuresis. She remained HDS and was cleared for discharge.

## 2024-02-19 NOTE — DISCHARGE SUMMARY
[facility] Medicine  Discharge Summary      Patient Name: Greer Valentin  MRN: [id number]  [facility]: [id number]  Patient Class: IP- Inpatient  Admission Date: 2/14/2024  Hospital Length of Stay: 1 days  Discharge Date and Time: 2/16/2024 12:11 PM  Discharging Provider: Va Castrejon MD  Primary Care Provider: Daniel Padgett MD    Primary Care Team: Networked reference to record PCT     HPI:   Greer Valentin 68 y.o. female with HTN and tobacco abuse presents to the hospital chief complaint of shortness a breath.  She reports 2-3 weeks of intermittent shortness breath that worsens with lying flat and improves with sitting upright.  She was attempted no treatments home.  She she denies any history of heart failure.  She does admit she was not been taking her home amlodipine as prescribed for hypertension.  She denies any other daily home medications.  She denies fever chest pain nausea vomiting abdominal pain leg swelling melena hematuria hematemesis dizziness and syncope.    In the ED, afebrile without leukocytosis initial blood pressure 186/100 chest x-ray with small bilateral pleural effusions and interstitial findings may reflect pulmonary edema .      Hospital Course:   She was given IV Lasix. Her echo revealed mild systolic CHF and pulmonary hypertension. She felt significantly better with diuresis. She remained HDS and was cleared for discharge.      Goals of Care Treatment Preferences:  Code Status: Full Code      Consults:   Consults (From admission, onward)          Status Ordering Provider     Inpatient consult to Social Work/Case Management  Once        Provider:  (Not yet assigned)    Completed LENORA SIERRA     Inpatient consult to Registered Dietitian/Nutritionist  Once        Provider:  (Not yet assigned)    Completed LENORA SIERRA            Cardiac/Vascular  * Systolic congestive heart failure  +Pulmonary edema and effusions on x-ray. BNP 700s.   pHTN also noted on  echo    Results for orders placed during the hospital encounter of 02/14/24    Echo    Interpretation Summary    Left Ventricle: The left ventricle is normal in size. Mildly increased wall thickness. There is concentric hypertrophy. Regional wall motion abnormalities present. There is mildly reduced systolic function with a visually estimated ejection fraction of 40 - 45%.  Anteroseptal hypokinesis.    Right Ventricle: Normal right ventricular cavity size. Systolic function is normal.    Left Atrium: Left atrium is severely dilated.    Mitral Valve: There is mild to moderate regurgitation.    Tricuspid Valve: There is mild regurgitation.    Pulmonary Artery: The estimated pulmonary artery systolic pressure is 44 mmHg.    IVC/SVC: Elevated venous pressure at 15 mmHg.    Started low dose Coreg. Continue.       Hyperlipidemia  Continue atorvastatin. Initiated while IP.      Essential hypertension, benign  Chronic, uncontrolled. Secondary to non compliance. Home meds reconciled. Fu with PCP.    Renal/  Hypokalemia  Repleted orally    GI  PUD (peptic ulcer disease)  No acute issues. Continue home protonix      Other  Tobacco abuse  Counseled on cessation. Greater than 3min spent counseling patient on dangers of continued tobacco abuse.       Final Active Diagnoses:    Diagnosis Date Noted POA    PRINCIPAL PROBLEM:  Systolic congestive heart failure [I50.20] 02/14/2024 Yes    Hypokalemia [E87.6] 02/14/2024 Yes    Tobacco abuse [Z72.0] 03/04/2019 Yes    Hyperlipidemia [E78.5] 03/04/2019 Yes     Chronic    Essential hypertension, benign [I10] 03/04/2019 Yes     Chronic    PUD (peptic ulcer disease) [K27.9] 03/04/2019 Yes     Chronic      Problems Resolved During this Admission:       Discharged Condition: stable    Disposition: Home or Self Care    Follow Up:    Patient Instructions:      Ambulatory referral/consult to Pulmonology   Standing Status: Future   Referral Priority: Routine Referral Type: Consultation    Referral Reason: Specialty Services Required   Requested Specialty: Pulmonary Disease   Number of Visits Requested: 1     Ambulatory referral/consult to Cardiology   Standing Status: Future   Referral Priority: Routine Referral Type: Consultation   Referral Reason: Specialty Services Required   Requested Specialty: Cardiology   Number of Visits Requested: 1     Diet Cardiac     Activity as tolerated       Pending Diagnostic Studies:       None           Medications:  Reconciled Home Medications:      Medication List        START taking these medications      atorvastatin 20 MG tablet  Commonly known as: LIPITOR  Take 1 tablet (20 mg total) by mouth once daily.     carvediloL 3.125 MG tablet  Commonly known as: COREG  Take 1 tablet (3.125 mg total) by mouth 2 (two) times daily.     furosemide 40 MG tablet  Commonly known as: LASIX  Take 1 tablet (40 mg total) by mouth once daily.     potassium chloride SA 20 MEQ tablet  Commonly known as: K-DUR,KLOR-CON  Take 2 tablets (40 mEq total) by mouth every other day.            CONTINUE taking these medications      amLODIPine 10 MG tablet  Commonly known as: NORVASC  Take 1 tablet (10 mg total) by mouth once daily.     aspirin 81 MG EC tablet  Commonly known as: ECOTRIN  Take 81 mg by mouth once daily.     pantoprazole 40 MG tablet  Commonly known as: PROTONIX  Take 1 tablet (40 mg total) by mouth once daily.              Indwelling Lines/Drains at time of discharge:   Lines/Drains/Airways       None                   Time spent on the discharge of patient: 33 minutes   Patient examined at bedside on day of discharge. Exam findings stable. She was deemed safe for discharge.        Va Castrejon MD  Department of Hospital Medicine  [facility]

## 2024-02-20 ENCOUNTER — OFFICE VISIT (OUTPATIENT)
Dept: FAMILY MEDICINE | Facility: CLINIC | Age: 69
End: 2024-02-20
Payer: MEDICARE

## 2024-02-20 VITALS
BODY MASS INDEX: 25.35 KG/M2 | TEMPERATURE: 98 F | HEART RATE: 73 BPM | HEIGHT: 66 IN | WEIGHT: 157.75 LBS | OXYGEN SATURATION: 98 % | SYSTOLIC BLOOD PRESSURE: 130 MMHG | DIASTOLIC BLOOD PRESSURE: 82 MMHG

## 2024-02-20 DIAGNOSIS — E78.5 HYPERLIPIDEMIA, UNSPECIFIED HYPERLIPIDEMIA TYPE: ICD-10-CM

## 2024-02-20 DIAGNOSIS — I10 ESSENTIAL HYPERTENSION, BENIGN: ICD-10-CM

## 2024-02-20 DIAGNOSIS — Z09 HOSPITAL DISCHARGE FOLLOW-UP: Primary | ICD-10-CM

## 2024-02-20 DIAGNOSIS — I50.9 CONGESTIVE HEART FAILURE, UNSPECIFIED HF CHRONICITY, UNSPECIFIED HEART FAILURE TYPE: ICD-10-CM

## 2024-02-20 PROCEDURE — 99213 OFFICE O/P EST LOW 20 MIN: CPT | Mod: PBBFAC,PO

## 2024-02-20 PROCEDURE — 99999 PR PBB SHADOW E&M-EST. PATIENT-LVL III: CPT | Mod: PBBFAC,,,

## 2024-02-20 PROCEDURE — 99213 OFFICE O/P EST LOW 20 MIN: CPT | Mod: S$PBB,,,

## 2024-02-20 NOTE — PROGRESS NOTES
HPI     Chief Complaint:  Chief Complaint   Patient presents with    Follow-up       Greer Valentin is a 68 y.o. female with multiple medical diagnoses as listed in the medical history and problem list that presents for hospital follow up.  Pt is not known to me with her last appointment 12/11/2023.      HPI  Pt presents for hospital follow up. Feels fine but does report some fatigue since starting on Atorvastatin. Has made dietary changes to help lower cholesterol.    Hospital Course:   She was given IV Lasix. Her echo revealed mild systolic CHF and pulmonary hypertension. She felt significantly better with diuresis. She remained HDS and was cleared for discharge.             Assessment & Plan     Problem List Items Addressed This Visit          Cardiac/Vascular    Essential hypertension, benign (Chronic)  BP in clinic today 130/82. The current medical regimen is effective;  continue present plan and medications.      Hyperlipidemia (Chronic)  Stable.  Started on atorvastatin on discharge from the hospital.  Has had some fatigue since starting atorvastatin but encouraged patient to continue on statin.  Discussed the possibility of changing atorvastatin to another medication if needed.  Continue dietary changes to lower cholesterol.     Other Visit Diagnoses       Hospital discharge follow-up    -  Primary  Hospital follow-up today for acute heart failure.  Feeling much better today with no shortness a breath chest pain cough or swelling.  We will refer to Cardiology for follow-up.      Congestive heart failure, unspecified HF chronicity, unspecified heart failure type     Hospital follow-up today for acute heart failure.  Feeling much better today with no shortness a breath chest pain cough or swelling.  We will refer to Cardiology for follow-up.      Relevant Orders    Ambulatory referral/consult to Cardiology              --------------------------------------------      Health Maintenance:  Health Maintenance   "       Date Due Completion Date    DEXA Scan Never done ---    RSV Vaccine (Age 60+ and Pregnant patients) (1 - 1-dose 60+ series) Never done ---    Shingles Vaccine (2 of 2) 04/07/2020 2/11/2020    Colorectal Cancer Screening 03/04/2021 3/4/2020    COVID-19 Vaccine (3 - 2023-24 season) 09/01/2023 8/21/2021    Mammogram 07/27/2024 7/27/2023    Hemoglobin A1c (Prediabetes) 02/14/2025 2/14/2024    Lipid Panel 02/15/2029 2/15/2024    TETANUS VACCINE 02/11/2030 2/11/2020            Discussed the importance of overdue vaccines which were offered during this encounter. Patient declined overdue vaccines at this time and Health maintenance reviewed    Follow Up:  No follow-ups on file.    Exam     Review of Systems:  (as noted above)  Review of Systems   Constitutional:  Negative for fever.   HENT:  Negative for trouble swallowing.    Eyes:  Negative for visual disturbance.   Respiratory:  Negative for cough, chest tightness and shortness of breath.    Cardiovascular:  Negative for chest pain and leg swelling.   Gastrointestinal:  Negative for blood in stool.       Physical Exam:   Physical Exam  Constitutional:       General: She is not in acute distress.     Appearance: Normal appearance. She is not ill-appearing or diaphoretic.   HENT:      Head: Normocephalic and atraumatic.   Cardiovascular:      Rate and Rhythm: Normal rate and regular rhythm.      Heart sounds: No murmur heard.     No friction rub. No gallop.   Pulmonary:      Effort: No respiratory distress.   Chest:      Chest wall: No tenderness.   Musculoskeletal:      Cervical back: No rigidity.   Neurological:      Mental Status: She is alert and oriented to person, place, and time.       Vitals:    02/20/24 1409   BP: 130/82   Pulse: 73   Temp: 98.3 °F (36.8 °C)   TempSrc: Oral   SpO2: 98%   Weight: 71.6 kg (157 lb 11.8 oz)   Height: 5' 6" (1.676 m)      Body mass index is 25.46 kg/m².        History     Past Medical History:  Past Medical History:   Diagnosis " Date    Gastroesophageal reflux disease 3/4/2019    Hyperlipidemia 3/4/2019    Hypertension     PUD (peptic ulcer disease) 3/4/2019    Tobacco abuse 3/4/2019       Past Surgical History:  Past Surgical History:   Procedure Laterality Date    chin lift      cosmetic surgery - done around 2005?       Social History:  Social History     Socioeconomic History    Marital status:    Tobacco Use    Smoking status: Some Days    Smokeless tobacco: Never   Substance and Sexual Activity    Alcohol use: No     Alcohol/week: 0.0 standard drinks of alcohol    Drug use: No    Sexual activity: Never     Social Determinants of Health     Financial Resource Strain: Medium Risk (2/10/2020)    Overall Financial Resource Strain (CARDIA)     Difficulty of Paying Living Expenses: Somewhat hard   Food Insecurity: No Food Insecurity (2/10/2020)    Hunger Vital Sign     Worried About Running Out of Food in the Last Year: Never true     Ran Out of Food in the Last Year: Never true   Transportation Needs: No Transportation Needs (2/10/2020)    PRAPARE - Transportation     Lack of Transportation (Medical): No     Lack of Transportation (Non-Medical): No   Physical Activity: Insufficiently Active (2/10/2020)    Exercise Vital Sign     Days of Exercise per Week: 2 days     Minutes of Exercise per Session: 10 min   Stress: Stress Concern Present (2/10/2020)    [employer] - Occupational Stress Questionnaire     Feeling of Stress : To some extent   Social Connections: Unknown (2/10/2020)    Social Connection and Isolation Panel [NHANES]     Frequency of Communication with Friends and Family: More than three times a week     Frequency of Social Gatherings with Friends and Family: Once a week     Active Member of Clubs or Organizations: Yes     Attends Club or Organization Meetings: More than 4 times per year     Marital Status:        Family History:  Family History   Problem Relation Age of Onset     Cancer Neg Hx        Allergies and Medications: (updated and reviewed)  Review of patient's allergies indicates:   Allergen Reactions    Sinus allergy Nausea Only     Current Outpatient Medications   Medication Sig Dispense Refill    amLODIPine (NORVASC) 10 MG tablet Take 1 tablet (10 mg total) by mouth once daily. 90 tablet 3    aspirin (ECOTRIN) 81 MG EC tablet Take 81 mg by mouth once daily.      atorvastatin (LIPITOR) 20 MG tablet Take 1 tablet (20 mg total) by mouth once daily. 30 tablet 0    carvediloL (COREG) 3.125 MG tablet Take 1 tablet (3.125 mg total) by mouth 2 (two) times daily. 60 tablet 0    furosemide (LASIX) 40 MG tablet Take 1 tablet (40 mg total) by mouth once daily. 30 tablet 0    pantoprazole (PROTONIX) 40 MG tablet Take 1 tablet (40 mg total) by mouth once daily. 90 tablet 3    potassium chloride SA (K-DUR,KLOR-CON) 20 MEQ tablet Take 2 tablets (40 mEq total) by mouth every other day. 30 tablet 0     No current facility-administered medications for this visit.       Patient Care Team:  Daniel Padgett MD as PCP - General (Family Medicine)  Bernadette Butler MA (Inactive) as Care Coordinator         - The patient is given an After Visit Summary that lists all medications with directions, allergies, education, orders placed during this encounter and follow-up instructions.      - I have reviewed the patient's medical information including past medical, family, and social history sections including the medications and allergies.      - We discussed the patient's current medications.     This note was created by combination of typed  and MModal dictation.  Transcription errors may be present.  If there are any questions, please contact me.       Jovita Gilbert NP

## 2024-02-21 ENCOUNTER — PATIENT OUTREACH (OUTPATIENT)
Dept: ADMINISTRATIVE | Facility: CLINIC | Age: 69
End: 2024-02-21
Payer: MEDICARE

## 2024-02-21 NOTE — PROGRESS NOTES
C3 nurse spoke with Greer Valentin for a TCC post hospital discharge follow up call. The patient completed a HOSFU with Jovita Gilbert NP (PCP) on 2/20/24. She was referred to Cardiology and is awaiting initial appointment scheduling.

## 2024-02-28 NOTE — PHYSICIAN QUERY
PT Name: Greer Valentin  MR #: [id number]     DOCUMENTATION CLARIFICATION     CDS/: Katie Badillo RN             Contact information: [email or web address]   This form is a permanent document in the medical record.     Query Date: February 28, 2024    By submitting this query, we are merely seeking further clarification of documentation.  Please utilize your independent clinical judgment when addressing the question(s) below.    The Medical Record contains the following   Indicators Supporting Clinical Findings Location in Medical Record   x Heart Failure documented  New onset of congestive heart failure     Systolic congestive heart failure  +Pulmonary edema and effusions on x-ray. BNP 700s   ED Provider Note 2/14    DCS 2/16       Hosp Med   x BNP BNP 700s.    CS 2/16       Hosp Med   x EF/Echo   Left Ventricle: The left ventricle is normal in size. Mildly increased wall thickness. There is concentric hypertrophy. Regional wall motion abnormalities present. There is mildly reduced systolic function with a visually estimated ejection fraction of 40 - 45%.  Anteroseptal hypokinesis.    Right Ventricle: Normal right ventricular cavity size. Systolic function is normal.    Left Atrium: Left atrium is severely dilated.    Mitral Valve: There is mild to moderate regurgitation.    Tricuspid Valve: There is mild regurgitation.    Pulmonary Artery: The estimated pulmonary artery systolic pressure is 44 mmHg.    IVC/SVC: Elevated venous pressure at 15 mmHg.  Echo 2/15    Radiology findings     x Subjective/Objective Respiratory Conditions Patient reports feeling SOB when lying down at night.     Pulmonary/Chest: She has rales in the right lower field and the left lower field.  ED Provider Note 2/14    Recent/Current MI      Heart Transplant, LVAD     x Edema, JVD    Right lower leg: Pitting Edema (trace) present.      Left lower leg: Edema (trace) present.    ED Provider Note 2/14    Ascites     x Diuretics/Meds She  was given IV Lasix. Her echo revealed mild systolic CHF and pulmonary hypertension. She felt significantly better with diuresis  DCS 2/16       Hosp Med    Other Treatment      Other       Heart failure is a clinical diagnosis which includes symptomatic fluid retention, elevated intracardiac pressures, and/or the inability of the heart to deliver adequate blood flow.    Heart Failure with reduced Ejection Fraction (HFrEF) or Systolic Heart Failure (loses ability to contract normally, EF is <40%)    Heart Failure with preserved Ejection Fraction (HFpEF) or Diastolic Heart Failure (stiff ventricles, does not relax properly, EF is >50%)     Heart Failure with Combined Systolic and Diastolic Failure (stiff ventricles, does not relax properly and EF is <50%)    Mid-range or mildly reduced ejection fraction (HFmrEF) is classified as systolic heart failure.  Congestive heart failure with a recovered EF is classified as Diastolic Heart Failure.  Common clues to acute exacerbation:  Rapidly progressive symptoms (w/in 2 weeks of presentation), using IV diuretics, using supplemental O2, pulmonary edema on Xray, new or worsening pleural effusion, +JVD or other signs of volume overload, MI w/in 4 weeks, and/or BNP >500  The clinical guidelines noted are only system guidelines, and do not replace the providers clinical judgment.    Provider, please further specify the Acuity of the Systolic Heart Failure diagnosis associated with the above clinical findings.    [ x  ]  Acute Systolic Heart Failure (HFrEF or HFmrEF) - new diagnosis     [   ]  Other (please specify): ___________________________________         Please document in your progress notes daily for the duration of treatment until resolved and include in your discharge summary.    References:  American Heart Association editorial staff. (2017, May). Ejection Fraction Heart Failure Measurement. American Heart Association.  https://www.heart.org/en/health-topics/heart-failure/diagnosing-heart-failure/ejection-fraction-heart-failure-measurement#:~:text=Ejection%20fraction%20(EF)%20is%20a,pushed%20out%20with%20each%20heartbeat  JOSELYN Bonilla (2020, December 15). Heart failure with preserved ejection fraction: Clinical manifestations and diagnosis. [employer]. https://www.[employer]/contents/heart-failure-with-preserved-ejection-fraction-clinical-manifestations-and-diagnosis.  ICD-10-CM/PCS Coding Clinic Third Quarter ICD-10, Effective with discharges: September 8, 2020 [facility] § Heart failure with mid-range or mildly reduced ejection fraction (2020).  ICD-10-CM/PCS Coding Clinic Third Quarter ICD-10, Effective with discharges: September 8, 2020 [facility] § Heart failure with recovered ejection fraction (2020).  Form No. 27551

## 2024-03-28 ENCOUNTER — PATIENT MESSAGE (OUTPATIENT)
Dept: ADMINISTRATIVE | Facility: HOSPITAL | Age: 69
End: 2024-03-28
Payer: MEDICARE

## 2024-03-28 ENCOUNTER — PATIENT OUTREACH (OUTPATIENT)
Dept: ADMINISTRATIVE | Facility: HOSPITAL | Age: 69
End: 2024-03-28
Payer: MEDICARE

## 2024-07-16 ENCOUNTER — OFFICE VISIT (OUTPATIENT)
Dept: FAMILY MEDICINE | Facility: CLINIC | Age: 69
End: 2024-07-16
Payer: MEDICARE

## 2024-07-16 VITALS
WEIGHT: 158.94 LBS | HEART RATE: 97 BPM | TEMPERATURE: 98 F | DIASTOLIC BLOOD PRESSURE: 80 MMHG | OXYGEN SATURATION: 98 % | BODY MASS INDEX: 25.54 KG/M2 | SYSTOLIC BLOOD PRESSURE: 126 MMHG | HEIGHT: 66 IN

## 2024-07-16 DIAGNOSIS — J30.2 SEASONAL ALLERGIC RHINITIS, UNSPECIFIED TRIGGER: Primary | ICD-10-CM

## 2024-07-16 DIAGNOSIS — I10 ESSENTIAL HYPERTENSION, BENIGN: ICD-10-CM

## 2024-07-16 DIAGNOSIS — I50.9 CONGESTIVE HEART FAILURE, UNSPECIFIED HF CHRONICITY, UNSPECIFIED HEART FAILURE TYPE: ICD-10-CM

## 2024-07-16 DIAGNOSIS — K21.9 GASTROESOPHAGEAL REFLUX DISEASE, UNSPECIFIED WHETHER ESOPHAGITIS PRESENT: ICD-10-CM

## 2024-07-16 PROCEDURE — 99213 OFFICE O/P EST LOW 20 MIN: CPT | Mod: PBBFAC,PO | Performed by: INTERNAL MEDICINE

## 2024-07-16 PROCEDURE — 99214 OFFICE O/P EST MOD 30 MIN: CPT | Mod: S$PBB,,, | Performed by: INTERNAL MEDICINE

## 2024-07-16 PROCEDURE — 99999 PR PBB SHADOW E&M-EST. PATIENT-LVL III: CPT | Mod: PBBFAC,,, | Performed by: INTERNAL MEDICINE

## 2024-07-16 RX ORDER — FUROSEMIDE 40 MG/1
40 TABLET ORAL DAILY
Qty: 30 TABLET | Refills: 3 | Status: SHIPPED | OUTPATIENT
Start: 2024-07-16 | End: 2025-07-16

## 2024-07-16 NOTE — PROGRESS NOTES
Chief complaint cough     Patient is a 68-year-old black female seen remotely by me.  She reports a cough just for 3-4 days.  She feels sinus drainage and drip and her cough is worse at night and has to sit up for relief.  She has taken some over-the-counter cough medications of unknown detail that did seem to help.  She does have Claritin and Zyrtec and so forth that home and some nasal sprays that she has not been doing.  She has a history of allergies.  She has a history of CHF and nose and has felt orthopnea before but has had no shortness a breath, edema or orthopnea.  She typically does not take the Lasix 40 mg but needs a refill.  She has to work and so can not take it during the day when she needs to work and we discussed that should she develop any mild fluid retention perhaps she can use a half a dose.      Review of systems:  No fevers or chills, no facial pain, no shortness a breath, no chest pain or wheezing      Past Medical History:   Diagnosis Date    Gastroesophageal reflux disease 3/4/2019    Hyperlipidemia 3/4/2019    Hypertension     PUD (peptic ulcer disease) clinical diagnosis from the ER, never had any bleeding, responded to PPI 3/4/2019    Tobacco abuse 3/4/2019     Gen: no distress  EYES: conjunctiva clear, non-icteric, PERRL  ENT: nose clear, nasal mucosa normal, oropharynx clear and moist, teeth good  NECK:supple, thyroid non-palpable  RESP: effort is good, lungs clear  CV: heart RRR w/o murmur, gallops or rubs; no carotid bruits, no edema  GI: abdomen soft, non-distended, non-tender,  MS: gait normal, no clubbing or cyanosis of the digits  SKIN: no rashes, warm to touch    Labs reviewed    Greer was seen today for cough.    Diagnoses and all orders for this visit:    Seasonal allergic rhinitis, unspecified trigger, flare-up of allergies as she does not feel she really has viral URI symptoms.  We discussed this can last days or even weeks.  She needs to start Claritin in the morning and  Chlor-Trimeton 4-8 mg before bed and suppress cough with Delsym.  She can use some sore throat spray to reduce the sensation of postnasal drip as well.    Congestive heart failure, unspecified HF chronicity, unspecified heart failure type, no active CHF suspected as the source of her cough    Essential hypertension, benign, chronic and stable, she is not on ACE inhibitor to cause cough    Gastroesophageal reflux disease, unspecified whether esophagitis present, reflux appears to be quiet and not suspected to be the cause of cough    Other orders  -     furosemide (LASIX) 40 MG tablet; Take 1 tablet (40 mg total) by mouth once daily.

## 2024-09-03 ENCOUNTER — PATIENT MESSAGE (OUTPATIENT)
Dept: ADMINISTRATIVE | Facility: HOSPITAL | Age: 69
End: 2024-09-03
Payer: MEDICARE

## 2024-10-08 ENCOUNTER — TELEPHONE (OUTPATIENT)
Dept: FAMILY MEDICINE | Facility: CLINIC | Age: 69
End: 2024-10-08
Payer: MEDICARE

## 2025-02-26 DIAGNOSIS — I10 ESSENTIAL HYPERTENSION, BENIGN: ICD-10-CM

## 2025-04-14 PROBLEM — I50.33 ACUTE ON CHRONIC DIASTOLIC CONGESTIVE HEART FAILURE: Status: ACTIVE | Noted: 2025-04-14

## 2025-04-14 PROBLEM — I10 PRIMARY HYPERTENSION: Status: ACTIVE | Noted: 2025-04-14

## 2025-04-14 PROBLEM — R06.09 DOE (DYSPNEA ON EXERTION): Status: ACTIVE | Noted: 2025-04-14

## 2025-05-05 ENCOUNTER — HOSPITAL ENCOUNTER (OUTPATIENT)
Dept: RADIOLOGY | Facility: HOSPITAL | Age: 70
Discharge: HOME | End: 2025-05-05
Attending: INTERNAL MEDICINE
Payer: MEDICARE

## 2025-05-05 ENCOUNTER — HOSPITAL ENCOUNTER (OUTPATIENT)
Dept: CARDIOLOGY | Facility: HOSPITAL | Age: 70
Discharge: HOME | End: 2025-05-05
Attending: INTERNAL MEDICINE
Payer: MEDICARE

## 2025-05-05 DIAGNOSIS — I10 PRIMARY HYPERTENSION: ICD-10-CM

## 2025-05-05 DIAGNOSIS — I50.33 ACUTE ON CHRONIC DIASTOLIC CONGESTIVE HEART FAILURE: ICD-10-CM

## 2025-05-05 DIAGNOSIS — R06.09 DOE (DYSPNEA ON EXERTION): ICD-10-CM

## 2025-05-05 LAB
CV STRESS BASE HR: 82 BPM
DIASTOLIC BLOOD PRESSURE: 104 MMHG
NUC STRESS DIASTOLIC VOLUME INDEX: 169
NUC STRESS EJECTION FRACTION: 29 %
NUC STRESS SYSTOLIC VOLUME INDEX: 120
OHS CV CPX 85 PERCENT MAX PREDICTED HEART RATE MALE: 128
OHS CV CPX MAX PREDICTED HEART RATE: 151
OHS CV CPX PATIENT IS FEMALE: 1
OHS CV CPX PATIENT IS MALE: 0
OHS CV CPX PEAK DIASTOLIC BLOOD PRESSURE: 110 MMHG
OHS CV CPX PEAK HEAR RATE: 87 BPM
OHS CV CPX PEAK RATE PRESSURE PRODUCT: NORMAL
OHS CV CPX PEAK SYSTOLIC BLOOD PRESSURE: 157 MMHG
OHS CV CPX PERCENT MAX PREDICTED HEART RATE ACHIEVED: 60
OHS CV CPX RATE PRESSURE PRODUCT PRESENTING: NORMAL
OHS CV INITIAL DOSE: 10.5 MCG/KG/MIN
OHS CV PEAK DOSE: 30.9 MCG/KG/MIN
SYSTOLIC BLOOD PRESSURE: 154 MMHG

## 2025-05-05 PROCEDURE — 78452 HT MUSCLE IMAGE SPECT MULT: CPT | Mod: 26,HCNC,, | Performed by: INTERNAL MEDICINE

## 2025-05-05 PROCEDURE — 93016 CV STRESS TEST SUPVJ ONLY: CPT | Mod: HCNC,,, | Performed by: INTERNAL MEDICINE

## 2025-05-05 PROCEDURE — 93018 CV STRESS TEST I&R ONLY: CPT | Mod: HCNC,,, | Performed by: INTERNAL MEDICINE

## 2025-05-05 PROCEDURE — A9502 TC99M TETROFOSMIN: HCPCS | Mod: HCNC | Performed by: INTERNAL MEDICINE

## 2025-05-05 PROCEDURE — 93017 CV STRESS TEST TRACING ONLY: CPT | Mod: HCNC

## 2025-05-05 PROCEDURE — 93306 TTE W/DOPPLER COMPLETE: CPT | Mod: HCNC

## 2025-05-05 PROCEDURE — 93306 TTE W/DOPPLER COMPLETE: CPT | Mod: 26,HCNC,, | Performed by: INTERNAL MEDICINE

## 2025-05-05 PROCEDURE — 78452 HT MUSCLE IMAGE SPECT MULT: CPT | Mod: HCNC

## 2025-05-05 RX ORDER — REGADENOSON 0.08 MG/ML
0.4 INJECTION, SOLUTION INTRAVENOUS ONCE
Status: COMPLETED | OUTPATIENT
Start: 2025-05-05 | End: 2025-05-05

## 2025-05-05 RX ADMIN — TETROFOSMIN 10.5 MILLICURIE: 1.38 INJECTION, POWDER, LYOPHILIZED, FOR SOLUTION INTRAVENOUS at 07:05

## 2025-05-05 RX ADMIN — TETROFOSMIN 30.9 MILLICURIE: 1.38 INJECTION, POWDER, LYOPHILIZED, FOR SOLUTION INTRAVENOUS at 08:05

## 2025-05-06 ENCOUNTER — TELEPHONE (OUTPATIENT)
Dept: CARDIOLOGY | Facility: CLINIC | Age: 70
End: 2025-05-06
Payer: MEDICARE

## 2025-05-06 LAB
ASCENDING AORTA: 3.4 CM
AV INDEX (PROSTH): 0.47
AV MEAN GRADIENT: 3 MMHG
AV PEAK GRADIENT: 5 MMHG
AV VALVE AREA BY VELOCITY RATIO: 1.6 CM²
AV VALVE AREA: 1.2 CM²
AV VELOCITY RATIO: 0.64
CV ECHO LV RWT: 0.45 CM
DOP CALC AO PEAK VEL: 1.1 M/S
DOP CALC AO VTI: 17.9 CM
DOP CALC LVOT AREA: 2.5 CM2
DOP CALC LVOT DIAMETER: 1.8 CM
DOP CALC LVOT PEAK VEL: 0.7 M/S
DOP CALC LVOT STROKE VOLUME: 21.6 CM3
DOP CALCLVOT PEAK VEL VTI: 8.5 CM
E WAVE DECELERATION TIME: 109 MSEC
E/A RATIO: 2.08
E/E' RATIO: 24 M/S
ECHO LV POSTERIOR WALL: 1.2 CM (ref 0.6–1.1)
FRACTIONAL SHORTENING: 9.4 % (ref 28–44)
INTERVENTRICULAR SEPTUM: 1 CM (ref 0.6–1.1)
IVC DIAMETER: 2.24 CM
LA MAJOR: 6.1 CM
LA MINOR: 5.8 CM
LA WIDTH: 4.2 CM
LEFT ATRIUM SIZE: 4.5 CM
LEFT ATRIUM VOLUME: 96 CM3
LEFT INTERNAL DIMENSION IN SYSTOLE: 4.8 CM (ref 2.1–4)
LEFT VENTRICLE DIASTOLIC VOLUME: 136 ML
LEFT VENTRICLE SYSTOLIC VOLUME: 107 ML
LEFT VENTRICULAR INTERNAL DIMENSION IN DIASTOLE: 5.3 CM (ref 3.5–6)
LEFT VENTRICULAR MASS: 227.7 G
LV LATERAL E/E' RATIO: 21.6 M/S
LV SEPTAL E/E' RATIO: 27 M/S
LVED V (TEICH): 135.99 ML
LVES V (TEICH): 106.79 ML
LVOT MG: 0.81 MMHG
LVOT MV: 0.41 CM/S
MV PEAK A VEL: 0.52 M/S
MV PEAK E VEL: 1.08 M/S
MV STENOSIS PRESSURE HALF TIME: 31.57 MS
MV VALVE AREA P 1/2 METHOD: 6.97 CM2
OHS CV RV/LV RATIO: 0.79 CM
PISA TR MAX VEL: 3.3 M/S
PV PEAK GRADIENT: 2 MMHG
PV PEAK VELOCITY: 0.64 M/S
RA MAJOR: 5.41 CM
RA PRESSURE ESTIMATED: 15 MMHG
RA WIDTH: 4.6 CM
RIGHT VENTRICLE DIASTOLIC BASEL DIMENSION: 4.2 CM
RIGHT VENTRICULAR END-DIASTOLIC DIMENSION: 4.2 CM
RV TB RVSP: 18 MMHG
RV TISSUE DOPPLER FREE WALL SYSTOLIC VELOCITY 1 (APICAL 4 CHAMBER VIEW): 6.9 CM/S
SINUS: 3.09 CM
STJ: 3.1 CM
TDI LATERAL: 0.05 M/S
TDI SEPTAL: 0.04 M/S
TDI: 0.05 M/S
TR MAX PG: 43 MMHG
TRICUSPID ANNULAR PLANE SYSTOLIC EXCURSION: 1 CM
TV REST PULMONARY ARTERY PRESSURE: 59 MMHG

## 2025-05-06 NOTE — TELEPHONE ENCOUNTER
I spoke with patient in regards to getting her labs done and helped her push her appointment back to 10:15. Patient said that she will try to get the labs done before her appointment tomorrow.    ----- Message from Duy sent at 5/6/2025  8:06 AM CDT -----  Contact: Patient  Type:  Patient CallWho Called: Patient Does the patient know what this is regarding?: Requesting a call back from a missed call ;please advise Would the patient rather a call back or a response via [facility]? Call Best Call Back Number: [phone] Additional Information:

## 2025-05-07 ENCOUNTER — LAB VISIT (OUTPATIENT)
Dept: LAB | Facility: HOSPITAL | Age: 70
End: 2025-05-07
Attending: INTERNAL MEDICINE
Payer: MEDICARE

## 2025-05-07 ENCOUNTER — OFFICE VISIT (OUTPATIENT)
Dept: CARDIOLOGY | Facility: CLINIC | Age: 70
End: 2025-05-07
Payer: MEDICARE

## 2025-05-07 VITALS
HEART RATE: 88 BPM | SYSTOLIC BLOOD PRESSURE: 120 MMHG | HEIGHT: 66 IN | DIASTOLIC BLOOD PRESSURE: 90 MMHG | WEIGHT: 138.25 LBS | OXYGEN SATURATION: 99 % | BODY MASS INDEX: 22.22 KG/M2 | RESPIRATION RATE: 17 BRPM

## 2025-05-07 DIAGNOSIS — I50.33 ACUTE ON CHRONIC DIASTOLIC CONGESTIVE HEART FAILURE: ICD-10-CM

## 2025-05-07 DIAGNOSIS — I10 ESSENTIAL HYPERTENSION, BENIGN: Chronic | ICD-10-CM

## 2025-05-07 DIAGNOSIS — I50.23 ACUTE ON CHRONIC SYSTOLIC CONGESTIVE HEART FAILURE: Primary | ICD-10-CM

## 2025-05-07 DIAGNOSIS — I10 PRIMARY HYPERTENSION: ICD-10-CM

## 2025-05-07 DIAGNOSIS — E78.5 HYPERLIPIDEMIA, UNSPECIFIED HYPERLIPIDEMIA TYPE: Chronic | ICD-10-CM

## 2025-05-07 DIAGNOSIS — R06.09 DOE (DYSPNEA ON EXERTION): ICD-10-CM

## 2025-05-07 LAB
ANION GAP (OHS): 11 MMOL/L (ref 8–16)
BNP SERPL-MCNC: 2697 PG/ML (ref 0–99)
BUN SERPL-MCNC: 12 MG/DL (ref 8–23)
CALCIUM SERPL-MCNC: 8.8 MG/DL (ref 8.7–10.5)
CHLORIDE SERPL-SCNC: 108 MMOL/L (ref 95–110)
CO2 SERPL-SCNC: 27 MMOL/L (ref 23–29)
CREAT SERPL-MCNC: 0.9 MG/DL (ref 0.5–1.4)
GFR SERPLBLD CREATININE-BSD FMLA CKD-EPI: >60 ML/MIN/1.73/M2
GLUCOSE SERPL-MCNC: 83 MG/DL (ref 70–110)
POTASSIUM SERPL-SCNC: 3.5 MMOL/L (ref 3.5–5.1)
SODIUM SERPL-SCNC: 146 MMOL/L (ref 136–145)

## 2025-05-07 PROCEDURE — 82435 ASSAY OF BLOOD CHLORIDE: CPT | Mod: HCNC

## 2025-05-07 PROCEDURE — 99999 PR PBB SHADOW E&M-EST. PATIENT-LVL IV: CPT | Mod: PBBFAC,HCNC,, | Performed by: INTERNAL MEDICINE

## 2025-05-07 PROCEDURE — 36415 COLL VENOUS BLD VENIPUNCTURE: CPT | Mod: HCNC

## 2025-05-07 PROCEDURE — 83880 ASSAY OF NATRIURETIC PEPTIDE: CPT | Mod: HCNC

## 2025-05-07 RX ORDER — DIPHENHYDRAMINE HCL 50 MG
50 CAPSULE ORAL ONCE
OUTPATIENT
Start: 2025-05-07 | End: 2025-05-07

## 2025-05-07 RX ORDER — SODIUM CHLORIDE 9 MG/ML
INJECTION, SOLUTION INTRAVENOUS CONTINUOUS
OUTPATIENT
Start: 2025-05-07 | End: 2025-05-07

## 2025-05-07 RX ORDER — SACUBITRIL AND VALSARTAN 49; 51 MG/1; MG/1
1 TABLET, FILM COATED ORAL 2 TIMES DAILY
Qty: 180 TABLET | Refills: 3 | Status: SHIPPED | OUTPATIENT
Start: 2025-05-07

## 2025-05-07 NOTE — H&P
[facility] - Cardiology  History & Physical    Subjective:      Chief Complaint/Reason for Admission: /[facility]    Greer Valentin is a 69 y.o. female.        Combined CHF, HTN     Admitted 2/14/24    Greer Valentin 68 y.o. female with HTN and tobacco abuse presents to the hospital chief complaint of shortness a breath.  She reports 2-3 weeks of intermittent shortness breath that worsens with lying flat and improves with sitting upright.  She was attempted no treatments home.  She she denies any history of heart failure.  She does admit she was not been taking her home amlodipine as prescribed for hypertension.  She denies any other daily home medications.  She denies fever chest pain nausea vomiting abdominal pain leg swelling melena hematuria hematemesis dizziness and syncope.     In the ED, afebrile without leukocytosis initial blood pressure 186/100 chest x-ray with small bilateral pleural effusions and interstitial findings may reflect pulmonary edema .        Hospital Course:   She was given IV Lasix. Her echo revealed mild systolic CHF and pulmonary hypertension. She felt significantly better with diuresis. She remained HDS and was cleared for discharge.      Stress test 5/5/25    Abnormal myocardial perfusion scan.    Transient ischemic dilatation (TID) noted on stress images, suggestive of  balanced ischemia from multivessel coronary artery disease    There is a mild to moderate intensity, small to medium sized, mostly fixed perfusion abnormality with some reversibilty in the basal to mid inferior wall(s) in the typical distribution of the RCA territory.    There are no other significant perfusion abnormalities.    There is trivial to mild apical thinning on rest and stress images which is a normal variant.    The gated perfusion images showed an ejection fraction of 29% post stress.    The ECG portion of the study is negative for ischemia.    The patient reported no chest pain during the stress test.     There were no arrhythmias during stress.    Echo 5/5/25    Left Ventricle: The left ventricle is mildly dilated. Normal wall thickness. There is moderate eccentric hypertrophy. Severe global hypokinesis present. There is severely reduced systolic function with a visually estimated ejection fraction of 15 - 20%. Grade III diastolic dysfunction.    Right Ventricle: The right ventricle has mild enlargement. Systolic function is moderately reduced.    Left Atrium: Severely dilated    Right Atrium: Right atrium is dilated.    Mitral Valve: There is moderate to severe regurgitation.    Tricuspid Valve: There is severe regurgitation.    Pulmonary Artery: There is pulmonary hypertension. The estimated pulmonary artery systolic pressure is 59 mmHg.    IVC/SVC: Elevated venous pressure at 15 mmHg.     Echo 2/15/24    Left Ventricle: The left ventricle is normal in size. Mildly increased wall thickness. There is concentric hypertrophy. Regional wall motion abnormalities present. There is mildly reduced systolic function with a visually estimated ejection fraction of 40 - 45%.  Anteroseptal hypokinesis.    Right Ventricle: Normal right ventricular cavity size. Systolic function is normal.    Left Atrium: Left atrium is severely dilated.    Mitral Valve: There is mild to moderate regurgitation.    Tricuspid Valve: There is mild regurgitation.    Pulmonary Artery: The estimated pulmonary artery systolic pressure is 44 mmHg.    IVC/SVC: Elevated venous pressure at 15 mmHg.     4/14/25 Reports fatigue and ALLEN for several months. Denies CP  EKG NSR LVH with repol  Stop norvasc  Start entresto 24-26 bid  Echo and lexiscan myoview for combined CHF  BNP, BMP  Continue Rx for HTN, combined CHF     Labs 5/7/25  K 3.5  Cr 0.9  BNP 2697 up from 781     5/7/25 ALLEN improving. Denies CP  BP controlled  Discussed drop in EF 15-20%. Recommend R/LHC to r/o CAD as cause for worsening EF and CHF  Increase entresto 49-51 bid   Continue Rx for HTN,  combined CHF    Past Medical History:   Diagnosis Date    Gastroesophageal reflux disease 3/4/2019    Hyperlipidemia 3/4/2019    Hypertension     PUD (peptic ulcer disease) 3/4/2019    Tobacco abuse 3/4/2019     Past Surgical History:   Procedure Laterality Date    chin lift      cosmetic surgery - done around 2005?     Social History[1]    (Not in a hospital admission)    Review of patient's allergies indicates:   Allergen Reactions    Sinus allergy Nausea Only        Review of Systems   All other systems reviewed and are negative.      Objective:      Vital Signs (Most Recent)  Pulse: 88 (05/07/25 1027)  Resp: 17 (05/07/25 1027)  BP: (!) 120/90 (05/07/25 1027)  SpO2: 99 % (05/07/25 1027)    Vital Signs Range (Last 24H):  [unfilled]    Physical Exam  Constitutional:       Appearance: She is well-developed.   HENT:      Head: Normocephalic and atraumatic.   Eyes:      Pupils: Pupils are equal, round, and reactive to light.   Cardiovascular:      Rate and Rhythm: Normal rate and regular rhythm.   Pulmonary:      Effort: Pulmonary effort is normal.      Breath sounds: Normal breath sounds.   Abdominal:      General: Bowel sounds are normal.      Palpations: Abdomen is soft.   Musculoskeletal:         General: Normal range of motion.      Cervical back: Normal range of motion and neck supple.   Skin:     General: Skin is warm and dry.   Neurological:      Mental Status: She is alert and oriented to person, place, and time.         Data Review:  CBC:   Lab Results   Component Value Date    WBC 12.21 02/14/2024    RBC 4.96 02/14/2024    HGB 12.6 02/14/2024    HCT 40.6 02/14/2024     02/14/2024     BMP:   Lab Results   Component Value Date    GLU 83 05/07/2025     (H) 02/16/2024     (H) 05/07/2025     02/16/2024    K 3.5 05/07/2025    K 4.0 02/16/2024     05/07/2025     02/16/2024    CO2 27 05/07/2025    CO2 25 02/16/2024    BUN 12 05/07/2025    CREATININE 0.9 05/07/2025     CALCIUM 8.8 05/07/2025    CALCIUM 9.2 02/16/2024      ECG: NSR LVH.     Assessment:      There are no hospital problems to display for this patient.    Systolic CHF    Plan:      R/LHC - will need staged high risk PCI if significant CAD present           [1]   Social History  Tobacco Use    Smoking status: Some Days    Smokeless tobacco: Never   Substance Use Topics    Alcohol use: No     Alcohol/week: 0.0 standard drinks of alcohol    Drug use: No

## 2025-05-07 NOTE — PROGRESS NOTES
Subjective   Patient ID:  Greer Valentin is a 69 y.o. female who presents for follow-up of Follow-up      HPI      Combined CHF, HTN     Admitted 2/14/24    Greer Valentin 68 y.o. female with HTN and tobacco abuse presents to the hospital chief complaint of shortness a breath.  She reports 2-3 weeks of intermittent shortness breath that worsens with lying flat and improves with sitting upright.  She was attempted no treatments home.  She she denies any history of heart failure.  She does admit she was not been taking her home amlodipine as prescribed for hypertension.  She denies any other daily home medications.  She denies fever chest pain nausea vomiting abdominal pain leg swelling melena hematuria hematemesis dizziness and syncope.     In the ED, afebrile without leukocytosis initial blood pressure 186/100 chest x-ray with small bilateral pleural effusions and interstitial findings may reflect pulmonary edema .        Hospital Course:   She was given IV Lasix. Her echo revealed mild systolic CHF and pulmonary hypertension. She felt significantly better with diuresis. She remained HDS and was cleared for discharge.      Stress test 5/5/25    Abnormal myocardial perfusion scan.    Transient ischemic dilatation (TID) noted on stress images, suggestive of  balanced ischemia from multivessel coronary artery disease    There is a mild to moderate intensity, small to medium sized, mostly fixed perfusion abnormality with some reversibilty in the basal to mid inferior wall(s) in the typical distribution of the RCA territory.    There are no other significant perfusion abnormalities.    There is trivial to mild apical thinning on rest and stress images which is a normal variant.    The gated perfusion images showed an ejection fraction of 29% post stress.    The ECG portion of the study is negative for ischemia.    The patient reported no chest pain during the stress test.    There were no arrhythmias during  stress.    Echo 5/5/25    Left Ventricle: The left ventricle is mildly dilated. Normal wall thickness. There is moderate eccentric hypertrophy. Severe global hypokinesis present. There is severely reduced systolic function with a visually estimated ejection fraction of 15 - 20%. Grade III diastolic dysfunction.    Right Ventricle: The right ventricle has mild enlargement. Systolic function is moderately reduced.    Left Atrium: Severely dilated    Right Atrium: Right atrium is dilated.    Mitral Valve: There is moderate to severe regurgitation.    Tricuspid Valve: There is severe regurgitation.    Pulmonary Artery: There is pulmonary hypertension. The estimated pulmonary artery systolic pressure is 59 mmHg.    IVC/SVC: Elevated venous pressure at 15 mmHg.     Echo 2/15/24    Left Ventricle: The left ventricle is normal in size. Mildly increased wall thickness. There is concentric hypertrophy. Regional wall motion abnormalities present. There is mildly reduced systolic function with a visually estimated ejection fraction of 40 - 45%.  Anteroseptal hypokinesis.    Right Ventricle: Normal right ventricular cavity size. Systolic function is normal.    Left Atrium: Left atrium is severely dilated.    Mitral Valve: There is mild to moderate regurgitation.    Tricuspid Valve: There is mild regurgitation.    Pulmonary Artery: The estimated pulmonary artery systolic pressure is 44 mmHg.    IVC/SVC: Elevated venous pressure at 15 mmHg.     4/14/25 Reports fatigue and ALLEN for several months. Denies CP  EKG NSR LVH with repol  Stop norvasc  Start entresto 24-26 bid  Echo and lexiscan myoview for combined CHF  BNP, BMP  Continue Rx for HTN, combined CHF     Labs 5/7/25  K 3.5  Cr 0.9  BNP 2697 up from 781     5/7/25 ALLEN improving. Denies CP  BP controlled  Discussed drop in EF 15-20%. Recommend R/LHC to r/o CAD as cause for worsening EF and CHF  Increase entresto 49-51 bid   Continue Rx for HTN, combined CHF    Review of Systems    Constitutional: Negative for decreased appetite.   HENT:  Negative for ear discharge.    Eyes:  Negative for blurred vision.   Respiratory:  Negative for hemoptysis.    Endocrine: Negative for polyphagia.   Hematologic/Lymphatic: Negative for adenopathy.   Skin:  Negative for color change.   Musculoskeletal:  Negative for joint swelling.   Genitourinary:  Negative for bladder incontinence.   Neurological:  Negative for brief paralysis.   Psychiatric/Behavioral:  Negative for hallucinations.    Allergic/Immunologic: Negative for hives.          Objective     Physical Exam  Constitutional:       Appearance: She is well-developed.   HENT:      Head: Normocephalic and atraumatic.   Eyes:      Conjunctiva/sclera: Conjunctivae normal.      Pupils: Pupils are equal, round, and reactive to light.   Cardiovascular:      Rate and Rhythm: Normal rate.      Pulses: Intact distal pulses.      Heart sounds: Normal heart sounds.   Pulmonary:      Effort: Pulmonary effort is normal.      Breath sounds: Normal breath sounds.   Abdominal:      General: Bowel sounds are normal.      Palpations: Abdomen is soft.   Musculoskeletal:         General: Normal range of motion.      Cervical back: Normal range of motion and neck supple.   Skin:     General: Skin is warm and dry.   Neurological:      Mental Status: She is alert and oriented to person, place, and time.            Assessment and Plan     1. Acute on chronic systolic congestive heart failure    2. Essential hypertension, benign    3. Hyperlipidemia, unspecified hyperlipidemia type    4. ALLEN (dyspnea on exertion)        Plan:    Discussed drop in EF 15-20%. Recommend R/LHC to r/o CAD as cause for worsening EF and CHF  Increase entresto 49-51 bid   Continue Rx for HTN, combined CHF    Advance Care Planning     Date: 05/07/2025  Patient did not wish or was not able to name a surrogate decision maker or provide an Advance Care Plan.

## 2025-05-12 ENCOUNTER — TELEPHONE (OUTPATIENT)
Dept: ENDOSCOPY | Facility: HOSPITAL | Age: 70
End: 2025-05-12
Payer: MEDICARE

## 2025-05-13 ENCOUNTER — HOSPITAL ENCOUNTER (OUTPATIENT)
Dept: PREADMISSION TESTING | Facility: HOSPITAL | Age: 70
Discharge: HOME | End: 2025-05-13
Attending: INTERNAL MEDICINE
Payer: MEDICARE

## 2025-05-14 ENCOUNTER — HOSPITAL ENCOUNTER (OUTPATIENT)
Dept: PREADMISSION TESTING | Facility: HOSPITAL | Age: 70
Discharge: HOME | End: 2025-05-14
Attending: INTERNAL MEDICINE
Payer: MEDICARE

## 2025-05-14 VITALS
BODY MASS INDEX: 22.75 KG/M2 | OXYGEN SATURATION: 99 % | WEIGHT: 144.94 LBS | DIASTOLIC BLOOD PRESSURE: 88 MMHG | HEIGHT: 67 IN | TEMPERATURE: 98 F | SYSTOLIC BLOOD PRESSURE: 143 MMHG | RESPIRATION RATE: 18 BRPM | HEART RATE: 87 BPM

## 2025-05-14 DIAGNOSIS — R06.09 DOE (DYSPNEA ON EXERTION): ICD-10-CM

## 2025-05-14 DIAGNOSIS — I50.23 ACUTE ON CHRONIC SYSTOLIC CONGESTIVE HEART FAILURE: ICD-10-CM

## 2025-05-14 DIAGNOSIS — E78.5 HYPERLIPIDEMIA, UNSPECIFIED HYPERLIPIDEMIA TYPE: Chronic | ICD-10-CM

## 2025-05-14 DIAGNOSIS — I10 ESSENTIAL HYPERTENSION, BENIGN: Chronic | ICD-10-CM

## 2025-05-14 LAB
ANION GAP (OHS): 13 MMOL/L (ref 8–16)
APTT PPP: 25.4 SECONDS (ref 21–32)
BUN SERPL-MCNC: 21 MG/DL (ref 8–23)
CALCIUM SERPL-MCNC: 8.9 MG/DL (ref 8.7–10.5)
CHLORIDE SERPL-SCNC: 104 MMOL/L (ref 95–110)
CO2 SERPL-SCNC: 26 MMOL/L (ref 23–29)
CREAT SERPL-MCNC: 1.1 MG/DL (ref 0.5–1.4)
ERYTHROCYTE [DISTWIDTH] IN BLOOD BY AUTOMATED COUNT: 18.2 % (ref 11.5–14.5)
GFR SERPLBLD CREATININE-BSD FMLA CKD-EPI: 55 ML/MIN/1.73/M2
GLUCOSE SERPL-MCNC: 109 MG/DL (ref 70–110)
HCT VFR BLD AUTO: 34.7 % (ref 37–48.5)
HGB BLD-MCNC: 10.4 GM/DL (ref 12–16)
INR PPP: 1.3 (ref 0.8–1.2)
MCH RBC QN AUTO: 22.8 PG (ref 27–31)
MCHC RBC AUTO-ENTMCNC: 30 G/DL (ref 32–36)
MCV RBC AUTO: 76 FL (ref 82–98)
PLATELET # BLD AUTO: 423 K/UL (ref 150–450)
PMV BLD AUTO: 9.3 FL (ref 9.2–12.9)
POTASSIUM SERPL-SCNC: 3.4 MMOL/L (ref 3.5–5.1)
PROTHROMBIN TIME: 14.3 SECONDS (ref 9–12.5)
RBC # BLD AUTO: 4.57 M/UL (ref 4–5.4)
SODIUM SERPL-SCNC: 143 MMOL/L (ref 136–145)
WBC # BLD AUTO: 11.54 K/UL (ref 3.9–12.7)

## 2025-05-14 PROCEDURE — 82310 ASSAY OF CALCIUM: CPT | Mod: HCNC | Performed by: INTERNAL MEDICINE

## 2025-05-14 PROCEDURE — 85027 COMPLETE CBC AUTOMATED: CPT | Mod: HCNC | Performed by: INTERNAL MEDICINE

## 2025-05-14 PROCEDURE — 85730 THROMBOPLASTIN TIME PARTIAL: CPT | Mod: HCNC | Performed by: INTERNAL MEDICINE

## 2025-05-14 PROCEDURE — 85610 PROTHROMBIN TIME: CPT | Mod: HCNC | Performed by: INTERNAL MEDICINE

## 2025-05-14 NOTE — DISCHARGE INSTRUCTIONS
YOUR PROCEDURE WILL BE AT [facility] at [address] [place] [address]                             Enter through the Main Entrance facing [address].      Your procedure  is scheduled for _____5/16/2025____________.    Call [phone] between 2pm and 5pm on __5/15/2025_____to find out your arrival time for the day of surgery.    You may have up to three visitors.  No children under 18 years old.      You will be going to the Same Day Surgery Unit on the 2nd floor of the hospital.    Report to the Same Day Surgery Registration Desk in the hallway.(Just beside the Same Day Surgery Unit)                    DO NOT PARK IN THE GARAGE.  THERE IS NO OPEN ENTRANCE TO THE HOSPITAL BUILDING AND NO ELEVATOR.      Important instructions:  Do not eat anything after midnight.  You may have plain water, non carbonated.  You may also have Gatorade or Powerade after midnight.    Stop all fluids 2 hours before your surgery.    It is okay to brush your teeth.  Do not have gum, candy or mints.    Please shower the night before and the morning of your surgery.        Use Chlorhexidine soap as instructed by your pre op nurse.   Please place clean linens on your bed the night before surgery. Please wear fresh clean clothing after each shower.    No shaving of procedural area at least 4-5 days before surgery due to increased risk of skin irritation and/or possible infection.    Contact lenses and removable denture work may not be worn during your procedure.    You may wear deodorant only.     Do not wear powder, body lotion, perfume/cologne or make-up.    Do not wear any jewelry or have any metal on your body.    You will be asked to remove any dentures or partials for the procedure.    If you are going home on the same day of surgery, you must arrange for a family member or a friend to drive you home.  Public transportation is prohibited.  You will not be able to drive home if you were  given anesthesia or sedation.    Please leave money and valuables home.      You may bring your cell phone.    Call the doctor if fever or illness should occur before your surgery.    Call [phone] to contact us here if needed.

## 2025-05-15 ENCOUNTER — TELEPHONE (OUTPATIENT)
Dept: SURGERY | Facility: HOSPITAL | Age: 70
End: 2025-05-15
Payer: MEDICARE

## 2025-05-16 ENCOUNTER — HOSPITAL ENCOUNTER (OUTPATIENT)
Facility: HOSPITAL | Age: 70
Discharge: HOME | End: 2025-05-16
Attending: INTERNAL MEDICINE | Admitting: INTERNAL MEDICINE
Payer: MEDICARE

## 2025-05-16 DIAGNOSIS — I50.23 ACUTE ON CHRONIC SYSTOLIC CONGESTIVE HEART FAILURE: ICD-10-CM

## 2025-05-16 DIAGNOSIS — R06.09 DOE (DYSPNEA ON EXERTION): ICD-10-CM

## 2025-05-16 DIAGNOSIS — E78.5 HYPERLIPIDEMIA, UNSPECIFIED HYPERLIPIDEMIA TYPE: Chronic | ICD-10-CM

## 2025-05-16 DIAGNOSIS — I10 ESSENTIAL HYPERTENSION, BENIGN: Chronic | ICD-10-CM

## 2025-05-16 PROCEDURE — C1760 CLOSURE DEV, VASC: HCPCS | Mod: HCNC | Performed by: INTERNAL MEDICINE

## 2025-05-16 PROCEDURE — C1751 CATH, INF, PER/CENT/MIDLINE: HCPCS | Mod: HCNC | Performed by: INTERNAL MEDICINE

## 2025-05-16 PROCEDURE — 99152 MOD SED SAME PHYS/QHP 5/>YRS: CPT | Mod: HCNC,,, | Performed by: INTERNAL MEDICINE

## 2025-05-16 PROCEDURE — 93460 R&L HRT ART/VENTRICLE ANGIO: CPT | Mod: 26,HCNC,, | Performed by: INTERNAL MEDICINE

## 2025-05-16 PROCEDURE — 25500020 PHARM REV CODE 255: Mod: HCNC | Performed by: INTERNAL MEDICINE

## 2025-05-16 PROCEDURE — 93460 R&L HRT ART/VENTRICLE ANGIO: CPT | Mod: HCNC | Performed by: INTERNAL MEDICINE

## 2025-05-16 PROCEDURE — C1769 GUIDE WIRE: HCPCS | Mod: HCNC | Performed by: INTERNAL MEDICINE

## 2025-05-16 PROCEDURE — C1887 CATHETER, GUIDING: HCPCS | Mod: HCNC | Performed by: INTERNAL MEDICINE

## 2025-05-16 PROCEDURE — 99152 MOD SED SAME PHYS/QHP 5/>YRS: CPT | Mod: HCNC | Performed by: INTERNAL MEDICINE

## 2025-05-16 PROCEDURE — 99153 MOD SED SAME PHYS/QHP EA: CPT | Mod: HCNC | Performed by: INTERNAL MEDICINE

## 2025-05-16 PROCEDURE — 63600175 PHARM REV CODE 636 W HCPCS: Mod: HCNC | Performed by: INTERNAL MEDICINE

## 2025-05-16 PROCEDURE — C1894 INTRO/SHEATH, NON-LASER: HCPCS | Mod: HCNC | Performed by: INTERNAL MEDICINE

## 2025-05-16 PROCEDURE — 25000003 PHARM REV CODE 250: Mod: HCNC | Performed by: INTERNAL MEDICINE

## 2025-05-16 DEVICE — ANGIO-SEAL VIP VASCULAR CLOSURE DEVICE
Type: IMPLANTABLE DEVICE | Site: ARTERIAL | Status: FUNCTIONAL
Brand: ANGIO-SEAL

## 2025-05-16 RX ORDER — HEPARIN SODIUM 200 [USP'U]/100ML
INJECTION, SOLUTION INTRAVENOUS
Status: DISCONTINUED | OUTPATIENT
Start: 2025-05-16 | End: 2025-05-16 | Stop reason: HOSPADM

## 2025-05-16 RX ORDER — FENTANYL CITRATE 50 UG/ML
INJECTION, SOLUTION INTRAMUSCULAR; INTRAVENOUS
Status: DISCONTINUED | OUTPATIENT
Start: 2025-05-16 | End: 2025-05-16 | Stop reason: HOSPADM

## 2025-05-16 RX ORDER — ACETAMINOPHEN 325 MG/1
650 TABLET ORAL EVERY 4 HOURS PRN
Status: DISCONTINUED | OUTPATIENT
Start: 2025-05-16 | End: 2025-05-16 | Stop reason: HOSPADM

## 2025-05-16 RX ORDER — SODIUM CHLORIDE 9 MG/ML
INJECTION, SOLUTION INTRAVENOUS CONTINUOUS
Status: ACTIVE | OUTPATIENT
Start: 2025-05-16 | End: 2025-05-16

## 2025-05-16 RX ORDER — LIDOCAINE HYDROCHLORIDE 10 MG/ML
INJECTION, SOLUTION EPIDURAL; INFILTRATION; INTRACAUDAL; PERINEURAL
Status: DISCONTINUED | OUTPATIENT
Start: 2025-05-16 | End: 2025-05-16 | Stop reason: HOSPADM

## 2025-05-16 RX ORDER — ONDANSETRON 8 MG/1
8 TABLET, ORALLY DISINTEGRATING ORAL EVERY 8 HOURS PRN
Status: DISCONTINUED | OUTPATIENT
Start: 2025-05-16 | End: 2025-05-16 | Stop reason: HOSPADM

## 2025-05-16 RX ORDER — MIDAZOLAM HYDROCHLORIDE 1 MG/ML
INJECTION, SOLUTION INTRAMUSCULAR; INTRAVENOUS
Status: DISCONTINUED | OUTPATIENT
Start: 2025-05-16 | End: 2025-05-16 | Stop reason: HOSPADM

## 2025-05-16 RX ORDER — NITROGLYCERIN 0.4 MG/1
0.4 TABLET SUBLINGUAL EVERY 5 MIN PRN
Status: DISCONTINUED | OUTPATIENT
Start: 2025-05-16 | End: 2025-05-16 | Stop reason: HOSPADM

## 2025-05-16 RX ORDER — HYDROCODONE BITARTRATE AND ACETAMINOPHEN 5; 325 MG/1; MG/1
1 TABLET ORAL EVERY 4 HOURS PRN
Refills: 0 | Status: DISCONTINUED | OUTPATIENT
Start: 2025-05-16 | End: 2025-05-16 | Stop reason: HOSPADM

## 2025-05-16 RX ORDER — DIPHENHYDRAMINE HCL 25 MG
50 CAPSULE ORAL ONCE
Status: COMPLETED | OUTPATIENT
Start: 2025-05-16 | End: 2025-05-16

## 2025-05-16 RX ADMIN — DIPHENHYDRAMINE HYDROCHLORIDE 50 MG: 25 CAPSULE ORAL at 06:05

## 2025-05-16 RX ADMIN — SODIUM CHLORIDE: 9 INJECTION, SOLUTION INTRAVENOUS at 06:05

## 2025-05-16 NOTE — Clinical Note
The catheter was inserted into the, was removed from the and was inserted over the wire into the ostium   left main. An angiography was performed of the left coronary arteries. The angiography was performed via power injection.

## 2025-05-16 NOTE — ASSESSMENT & PLAN NOTE
5/16/25 R/LHC - EDP 37, PCWP 38, PA 46/27 (mean 36), RV 56/30, RA 20, CO 4.4. LM distal 30%. LAD proximal 70% - moderate diffuse mid to distal disease 90% near apex, RI - large - luminal irregularities, Cx small -ok, RCA mild diffuse disease.     CM out of proportion to CAD  Reviewed with Dr Urrutia - initial medical Rx for LAD disease given the diffuse distal disease  IV lasix given in cath lab for elevated EDP and PCWP     Latest ECHO  Results for orders placed during the hospital encounter of 05/05/25    Echo    Interpretation Summary    Left Ventricle: The left ventricle is mildly dilated. Normal wall thickness. There is moderate eccentric hypertrophy. Severe global hypokinesis present. There is severely reduced systolic function with a visually estimated ejection fraction of 15 - 20%. Grade III diastolic dysfunction.    Right Ventricle: The right ventricle has mild enlargement. Systolic function is moderately reduced.    Left Atrium: Severely dilated    Right Atrium: Right atrium is dilated.    Mitral Valve: There is moderate to severe regurgitation.    Tricuspid Valve: There is severe regurgitation.    Pulmonary Artery: There is pulmonary hypertension. The estimated pulmonary artery systolic pressure is 59 mmHg.    IVC/SVC: Elevated venous pressure at 15 mmHg.    Current Heart Failure Medications       Plan  - Monitor strict I&Os and daily weights.    - Place on telemetry  - Low sodium diet

## 2025-05-16 NOTE — Clinical Note
The catheter was inserted into the, was removed from the and was inserted over the wire into the ostium   right coronary artery. An angiography was performed of the right coronary arteries. The angiography was performed via power injection.

## 2025-05-16 NOTE — H&P
[facility] - [place], [place] - Cardiology  History & Physical    Subjective:      Chief Complaint/Reason for Admission: /[facility]    Greer Valentin is a 69 y.o. female.        Combined CHF, HTN     Admitted 2/14/24    Greer Valentin 68 y.o. female with HTN and tobacco abuse presents to the hospital chief complaint of shortness a breath.  She reports 2-3 weeks of intermittent shortness breath that worsens with lying flat and improves with sitting upright.  She was attempted no treatments home.  She she denies any history of heart failure.  She does admit she was not been taking her home amlodipine as prescribed for hypertension.  She denies any other daily home medications.  She denies fever chest pain nausea vomiting abdominal pain leg swelling melena hematuria hematemesis dizziness and syncope.     In the ED, afebrile without leukocytosis initial blood pressure 186/100 chest x-ray with small bilateral pleural effusions and interstitial findings may reflect pulmonary edema .        Hospital Course:   She was given IV Lasix. Her echo revealed mild systolic CHF and pulmonary hypertension. She felt significantly better with diuresis. She remained HDS and was cleared for discharge.      Stress test 5/5/25    Abnormal myocardial perfusion scan.    Transient ischemic dilatation (TID) noted on stress images, suggestive of  balanced ischemia from multivessel coronary artery disease    There is a mild to moderate intensity, small to medium sized, mostly fixed perfusion abnormality with some reversibilty in the basal to mid inferior wall(s) in the typical distribution of the RCA territory.    There are no other significant perfusion abnormalities.    There is trivial to mild apical thinning on rest and stress images which is a normal variant.    The gated perfusion images showed an ejection fraction of 29% post stress.    The ECG portion of the study is negative for ischemia.    The patient reported no chest pain during the stress test.     There were no arrhythmias during stress.    Echo 5/5/25    Left Ventricle: The left ventricle is mildly dilated. Normal wall thickness. There is moderate eccentric hypertrophy. Severe global hypokinesis present. There is severely reduced systolic function with a visually estimated ejection fraction of 15 - 20%. Grade III diastolic dysfunction.    Right Ventricle: The right ventricle has mild enlargement. Systolic function is moderately reduced.    Left Atrium: Severely dilated    Right Atrium: Right atrium is dilated.    Mitral Valve: There is moderate to severe regurgitation.    Tricuspid Valve: There is severe regurgitation.    Pulmonary Artery: There is pulmonary hypertension. The estimated pulmonary artery systolic pressure is 59 mmHg.    IVC/SVC: Elevated venous pressure at 15 mmHg.     Echo 2/15/24    Left Ventricle: The left ventricle is normal in size. Mildly increased wall thickness. There is concentric hypertrophy. Regional wall motion abnormalities present. There is mildly reduced systolic function with a visually estimated ejection fraction of 40 - 45%.  Anteroseptal hypokinesis.    Right Ventricle: Normal right ventricular cavity size. Systolic function is normal.    Left Atrium: Left atrium is severely dilated.    Mitral Valve: There is mild to moderate regurgitation.    Tricuspid Valve: There is mild regurgitation.    Pulmonary Artery: The estimated pulmonary artery systolic pressure is 44 mmHg.    IVC/SVC: Elevated venous pressure at 15 mmHg.     4/14/25 Reports fatigue and ALLEN for several months. Denies CP  EKG NSR LVH with repol  Stop norvasc  Start entresto 24-26 bid  Echo and lexiscan myoview for combined CHF  BNP, BMP  Continue Rx for HTN, combined CHF     Labs 5/7/25  K 3.5  Cr 0.9  BNP 2697 up from 781     5/7/25 ALLEN improving. Denies CP  BP controlled  Discussed drop in EF 15-20%. Recommend R/LHC to r/o CAD as cause for worsening EF and CHF  Increase entresto 49-51 bid   Continue Rx for HTN,  combined CHF    Past Medical History:   Diagnosis Date    CHF (congestive heart failure)     Gastroesophageal reflux disease 03/04/2019    Hyperlipidemia 03/04/2019    Hypertension     PUD (peptic ulcer disease) 03/04/2019    Tobacco abuse 03/04/2019     Past Surgical History:   Procedure Laterality Date    chin lift      cosmetic surgery - done around 2005?     Social History[1]    PTA Medications   Medication Sig    aspirin (ECOTRIN) 81 MG EC tablet Take 81 mg by mouth once daily.    atorvastatin (LIPITOR) 20 MG tablet Take 1 tablet (20 mg total) by mouth once daily.    benzonatate (TESSALON) 100 MG capsule Take 1 capsule by mouth every 8 (eight) hours as needed.    carvediloL (COREG) 3.125 MG tablet Take 1 tablet (3.125 mg total) by mouth 2 (two) times daily.    EScitalopram oxalate (LEXAPRO) 10 MG tablet Take 10 mg by mouth once daily.    ferrous sulfate 325 (65 FE) MG EC tablet Take 325 mg by mouth after dinner.    furosemide (LASIX) 20 MG tablet Take 1 tablet (20 mg total) by mouth 2 (two) times daily.    pantoprazole (PROTONIX) 40 MG tablet Take 1 tablet (40 mg total) by mouth once daily.    potassium chloride SA (K-DUR,KLOR-CON) 20 MEQ tablet Take 2 tablets (40 mEq total) by mouth every other day.    sacubitriL-valsartan (ENTRESTO) 49-51 mg per tablet Take 1 tablet by mouth 2 (two) times daily.     Review of patient's allergies indicates:  No Known Allergies       Review of Systems   All other systems reviewed and are negative.      Objective:      Vital Signs (Most Recent)  Temp: 98.1 °F (36.7 °C) (05/16/25 0614)  Pulse: 82 (05/16/25 0616)  Resp: 18 (05/16/25 0614)  BP: (!) 148/92 (05/16/25 0616)  SpO2: 99 % (05/16/25 0614)    Vital Signs Range (Last 24H):  [unfilled]    Physical Exam  Constitutional:       Appearance: She is well-developed.   HENT:      Head: Normocephalic and atraumatic.   Eyes:      Pupils: Pupils are equal, round, and reactive to light.   Cardiovascular:      Rate and Rhythm:  Normal rate and regular rhythm.   Pulmonary:      Effort: Pulmonary effort is normal.      Breath sounds: Normal breath sounds.   Abdominal:      General: Bowel sounds are normal.      Palpations: Abdomen is soft.   Musculoskeletal:         General: Normal range of motion.      Cervical back: Normal range of motion and neck supple.   Skin:     General: Skin is warm and dry.   Neurological:      Mental Status: She is alert and oriented to person, place, and time.         Data Review:  CBC:   Lab Results   Component Value Date    WBC 11.54 05/14/2025    RBC 4.57 05/14/2025    RBC 4.96 02/14/2024    HGB 10.4 (L) 05/14/2025    HGB 12.6 02/14/2024    HCT 34.7 (L) 05/14/2025    HCT 40.6 02/14/2024     05/14/2025     02/14/2024     BMP:   Lab Results   Component Value Date     05/14/2025     (H) 02/16/2024     05/14/2025     02/16/2024    K 3.4 (L) 05/14/2025    K 4.0 02/16/2024     05/14/2025     02/16/2024    CO2 26 05/14/2025    CO2 25 02/16/2024    BUN 21 05/14/2025    CREATININE 1.1 05/14/2025    CALCIUM 8.9 05/14/2025    CALCIUM 9.2 02/16/2024      ECG: NSR LVH.     Assessment:      There are no hospital problems to display for this patient.    Systolic CHF    Plan:      R/LHC - will need staged high risk PCI if significant CAD present           [1]   Social History  Tobacco Use    Smoking status: Some Days    Smokeless tobacco: Never   Substance Use Topics    Alcohol use: No     Alcohol/week: 0.0 standard drinks of alcohol    Drug use: No

## 2025-05-16 NOTE — PROCEDURES
Greer Valentin is a 69 y.o. female patient.    Temp: 98.1 °F (36.7 °C) (05/16/25 0614)  Pulse: 82 (05/16/25 0616)  Resp: 18 (05/16/25 0614)  BP: (!) 148/92 (05/16/25 0616)  SpO2: 99 % (05/16/25 0614)  Weight: 65.4 kg (144 lb 2.4 oz) (05/15/25 0728)  Mallampati Scale: Class II  ASA Classification: Class 3    Procedures    R/LH   Dr Armstrong  Pre-op Dx CHF  Post-op Dx same  Specimen none  EBL < 50 cc    5/16/25 R/LHC - EDP 37, PCWP 38, PA 46/27 (mean 36), RV 56/30, RA 20, CO 4.4. LM distal 30%. LAD proximal 70% - moderate diffuse mid to distal disease 90% near apex, RI - large - luminal irregularities, Cx small -ok, RCA mild diffuse disease.    CM out of proportion to CAD  Reviewed with Dr Urrutia - initial medical Rx for LAD disease given the diffuse distal disease  IV lasix given in cath lab for elevated EDP and PCWP    5/16/2025

## 2025-05-16 NOTE — HOSPITAL COURSE
5/16/25 /[facility] - EDP 37, PCWP 38, PA 46/27 (mean 36), RV 56/30, RA 20, CO 4.4. LM distal 30%. LAD proximal 70% - moderate diffuse mid to distal disease 90% near apex, RI - large - luminal irregularities, Cx small -ok, RCA mild diffuse disease.     CM out of proportion to CAD  Reviewed with Dr Urrutia - initial medical Rx for LAD disease given the diffuse distal disease  IV lasix given in cath lab for elevated EDP and PCWP

## 2025-05-16 NOTE — DISCHARGE INSTRUCTIONS
Drink plenty of fluids for the next 48 hours and follow your doctor's diet orders.    Rest for the next 72 hours.     If your puncture site is on the groin, do not keep the injected leg bent for a long period of time.    Remove the dressing in 24 hours, and you may shower. Clean the area with soap and water; apply an adhesive bandage over the puncture site for the  next 5 days.    No Lifting over 5 lbs., that is, not more than 1/2 gallon of water, or straining for 72 hours.    No driving, no drinking alcohol, and no signing legal documents for the next 24 hours.    Call your doctor for elevated temperature, shortness of breath, chest pain, or cold discolored arm/leg.     If oozing occurs at the injections site, lie down. Apply pressure with a clean wash cloth for 20 to 30 minutes and call  your doctor.    If severe bleeding occurs, lie down, apply pressure. Call 911 and request an ambulance to take you to the nearest  hospital emergency room.    Continue to take your regular medications as instructed.    Follow the instructions in the handout given to you.          Fall Prevention  Millions of people fall every year and injure themselves. You may have had anesthesia or sedation which may increase your risk of falling. You may have health issues that put you at an increased risk of falling.     Here are ways to reduce your risk of falling.    Make your home safe by keeping walkways clear of objects you may trip over.  Use non-slip pads under rugs. Do not use area rugs or small throw rugs.  Use non-slip mats in bathtubs and showers.  Install handrails and lights on staircases.  Do not walk in poorly lit areas.  Do not stand on chairs or wobbly ladders.  Use caution when reaching overhead or looking upward. This position can cause a loss of balance.  Be sure your shoes fit properly, have non-slip bottoms and are in good condition.   Wear shoes both inside and out. Avoid going barefoot or wearing slippers.  Be cautious  when going up and down stairs, curbs, and when walking on uneven sidewalks.  If your balance is poor, consider using a cane or walker.  If your fall was related to alcohol use, stop or limit alcohol intake.   If your fall was related to use of sleeping medicines, talk to your doctor about this. You may need to reduce your dosage at bedtime if you awaken during the night to go to the bathroom.    To reduce the need for nighttime bathroom trips:  Avoid drinking fluids for several hours before going to bed  Empty your bladder before going to bed  Men can keep a urinal at the bedside  Stay as active as you can. Balance, flexibility, strength, and endurance all come from exercise. They all play a role in preventing falls. Ask your healthcare provider which types of activity are right for you.  Get your vision checked on a regular basis.  If you have pets, know where they are before you stand up or walk so you don't trip over them.  Use night lights.

## 2025-05-16 NOTE — DISCHARGE SUMMARY
[facility] [place] [facility]  [place]iology  Discharge Summary      Patient Name: Greer Valentin  MRN: [id number]  Admission Date: 5/16/2025  Hospital Length of Stay: 0 days  Discharge Date and Time: 05/16/2025 8:15 AM  Attending Physician: David Armstrong MD    Discharging Provider: David Armstrong MD  Primary Care Physician: Daniel Padgett MD    HPI:   No notes on file    Procedure(s) (LRB):  CATHETERIZATION, HEART, BOTH LEFT AND RIGHT (N/A)     Indwelling Lines/Drains at time of discharge:  Lines/Drains/Airways       None                   Hospital Course:    5/16/25 R/LHC - EDP 37, PCWP 38, PA 46/27 (mean 36), RV 56/30, RA 20, CO 4.4. LM distal 30%. LAD proximal 70% - moderate diffuse mid to distal disease 90% near apex, RI - large - luminal irregularities, Cx small -ok, RCA mild diffuse disease.     CM out of proportion to CAD  Reviewed with Dr Urrutia - initial medical Rx for LAD disease given the diffuse distal disease  IV lasix given in cath lab for elevated EDP and PCWP       Goals of Care Treatment Preferences:  Code Status: Full Code      Consults:     Significant Diagnostic Studies: Angiography:     Pending Diagnostic Studies:       None            Final Active Diagnoses:    Diagnosis Date Noted POA    PRINCIPAL PROBLEM:  Systolic congestive heart failure [I50.20] 02/14/2024 Yes      Problems Resolved During this Admission:     Cardiac/Vascular  * Systolic congestive heart failure    5/16/25 R/LHC - EDP 37, PCWP 38, PA 46/27 (mean 36), RV 56/30, RA 20, CO 4.4. LM distal 30%. LAD proximal 70% - moderate diffuse mid to distal disease 90% near apex, RI - large - luminal irregularities, Cx small -ok, RCA mild diffuse disease.     CM out of proportion to CAD  Reviewed with Dr Urrutia - initial medical Rx for LAD disease given the diffuse distal disease  IV lasix given in cath lab for elevated EDP and PCWP     Latest ECHO  Results for orders placed during the hospital encounter of 05/05/25    Echo    Interpretation  Summary    Left Ventricle: The left ventricle is mildly dilated. Normal wall thickness. There is moderate eccentric hypertrophy. Severe global hypokinesis present. There is severely reduced systolic function with a visually estimated ejection fraction of 15 - 20%. Grade III diastolic dysfunction.    Right Ventricle: The right ventricle has mild enlargement. Systolic function is moderately reduced.    Left Atrium: Severely dilated    Right Atrium: Right atrium is dilated.    Mitral Valve: There is moderate to severe regurgitation.    Tricuspid Valve: There is severe regurgitation.    Pulmonary Artery: There is pulmonary hypertension. The estimated pulmonary artery systolic pressure is 59 mmHg.    IVC/SVC: Elevated venous pressure at 15 mmHg.    Current Heart Failure Medications       Plan  - Monitor strict I&Os and daily weights.    - Place on telemetry  - Low sodium diet          Discharged Condition: good    Disposition: Home or Self Care    Follow Up:    Patient Instructions:   No discharge procedures on file.  Medications:  Reconciled Home Medications:      Medication List        CONTINUE taking these medications      aspirin 81 MG EC tablet  Commonly known as: ECOTRIN  Take 81 mg by mouth once daily.     atorvastatin 20 MG tablet  Commonly known as: LIPITOR  Take 1 tablet (20 mg total) by mouth once daily.     benzonatate 100 MG capsule  Commonly known as: TESSALON  Take 1 capsule by mouth every 8 (eight) hours as needed.     carvediloL 3.125 MG tablet  Commonly known as: COREG  Take 1 tablet (3.125 mg total) by mouth 2 (two) times daily.     ENTRESTO 49-51 mg per tablet  Generic drug: sacubitriL-valsartan  Take 1 tablet by mouth 2 (two) times daily.     EScitalopram oxalate 10 MG tablet  Commonly known as: LEXAPRO  Take 10 mg by mouth once daily.     ferrous sulfate 325 (65 FE) MG EC tablet  Take 325 mg by mouth after dinner.     furosemide 20 MG tablet  Commonly known as: LASIX  Take 1 tablet (20 mg total) by  mouth 2 (two) times daily.     pantoprazole 40 MG tablet  Commonly known as: PROTONIX  Take 1 tablet (40 mg total) by mouth once daily.     potassium chloride SA 20 MEQ tablet  Commonly known as: K-DUR,KLOR-CON  Take 2 tablets (40 mEq total) by mouth every other day.              Time spent on the discharge of patient: 30 minutes    David Armstrong MD  Cardiology  [facility] [place] [facility]

## 2025-05-16 NOTE — OR NURSING
Dr. Armstrong notified of patient taking last dose of Coreg 3.125 mg. Yesterday 1500.  Entresto 49/1 mg. And Aspirin 81 mg. Yesterday 1000 am.

## 2025-05-19 VITALS
HEART RATE: 85 BPM | WEIGHT: 144.13 LBS | BODY MASS INDEX: 22.92 KG/M2 | OXYGEN SATURATION: 100 % | TEMPERATURE: 98 F | RESPIRATION RATE: 18 BRPM | DIASTOLIC BLOOD PRESSURE: 111 MMHG | SYSTOLIC BLOOD PRESSURE: 164 MMHG

## 2025-05-26 DIAGNOSIS — Z00.00 ENCOUNTER FOR MEDICARE ANNUAL WELLNESS EXAM: ICD-10-CM

## 2025-05-30 ENCOUNTER — OFFICE VISIT (OUTPATIENT)
Dept: CARDIOLOGY | Facility: CLINIC | Age: 70
End: 2025-05-30
Payer: MEDICARE

## 2025-05-30 VITALS
HEART RATE: 110 BPM | WEIGHT: 144.19 LBS | HEIGHT: 67 IN | DIASTOLIC BLOOD PRESSURE: 88 MMHG | BODY MASS INDEX: 22.63 KG/M2 | OXYGEN SATURATION: 99 % | SYSTOLIC BLOOD PRESSURE: 146 MMHG

## 2025-05-30 DIAGNOSIS — E78.5 HYPERLIPIDEMIA, UNSPECIFIED HYPERLIPIDEMIA TYPE: Chronic | ICD-10-CM

## 2025-05-30 DIAGNOSIS — I50.23 ACUTE ON CHRONIC SYSTOLIC CONGESTIVE HEART FAILURE: Primary | ICD-10-CM

## 2025-05-30 DIAGNOSIS — R06.09 DOE (DYSPNEA ON EXERTION): ICD-10-CM

## 2025-05-30 DIAGNOSIS — I50.33 ACUTE ON CHRONIC DIASTOLIC CONGESTIVE HEART FAILURE: ICD-10-CM

## 2025-05-30 DIAGNOSIS — I10 ESSENTIAL HYPERTENSION, BENIGN: Chronic | ICD-10-CM

## 2025-05-30 PROCEDURE — 99999 PR PBB SHADOW E&M-EST. PATIENT-LVL III: CPT | Mod: PBBFAC,HCNC,, | Performed by: INTERNAL MEDICINE

## 2025-05-30 RX ORDER — CARVEDILOL 6.25 MG/1
6.25 TABLET ORAL 2 TIMES DAILY
Qty: 180 TABLET | Refills: 3 | Status: SHIPPED | OUTPATIENT
Start: 2025-05-30 | End: 2026-05-30

## 2025-05-30 RX ORDER — ATORVASTATIN CALCIUM 20 MG/1
20 TABLET, FILM COATED ORAL DAILY
Qty: 90 TABLET | Refills: 3 | Status: SHIPPED | OUTPATIENT
Start: 2025-05-30 | End: 2026-05-30

## 2025-05-30 NOTE — PROGRESS NOTES
Subjective   Patient ID:  Greer Valentin is a 69 y.o. female who presents for follow-up of No chief complaint on file.      HPI           Combined CHF, HTN     Admitted 2/14/24    Greer Valentin 68 y.o. female with HTN and tobacco abuse presents to the hospital chief complaint of shortness a breath.  She reports 2-3 weeks of intermittent shortness breath that worsens with lying flat and improves with sitting upright.  She was attempted no treatments home.  She she denies any history of heart failure.  She does admit she was not been taking her home amlodipine as prescribed for hypertension.  She denies any other daily home medications.  She denies fever chest pain nausea vomiting abdominal pain leg swelling melena hematuria hematemesis dizziness and syncope.     In the ED, afebrile without leukocytosis initial blood pressure 186/100 chest x-ray with small bilateral pleural effusions and interstitial findings may reflect pulmonary edema .        Hospital Course:   She was given IV Lasix. Her echo revealed mild systolic CHF and pulmonary hypertension. She felt significantly better with diuresis. She remained HDS and was cleared for discharge.        5/16/25 R/LHC - EDP 37, PCWP 38, PA 46/27 (mean 36), RV 56/30, RA 20, CO 4.4. LM distal 30%. LAD proximal 70% - moderate diffuse mid to distal disease 90% near apex, RI - large - luminal irregularities, Cx small -ok, RCA mild diffuse disease.     CM out of proportion to CAD  Reviewed with Dr Urrutia - initial medical Rx for LAD disease given the diffuse distal disease  IV lasix given in cath lab for elevated EDP and PCWP        Stress test 5/5/25    Abnormal myocardial perfusion scan.    Transient ischemic dilatation (TID) noted on stress images, suggestive of  balanced ischemia from multivessel coronary artery disease    There is a mild to moderate intensity, small to medium sized, mostly fixed perfusion abnormality with some reversibilty in the basal to mid inferior  wall(s) in the typical distribution of the RCA territory.    There are no other significant perfusion abnormalities.    There is trivial to mild apical thinning on rest and stress images which is a normal variant.    The gated perfusion images showed an ejection fraction of 29% post stress.    The ECG portion of the study is negative for ischemia.    The patient reported no chest pain during the stress test.    There were no arrhythmias during stress.     Echo 5/5/25    Left Ventricle: The left ventricle is mildly dilated. Normal wall thickness. There is moderate eccentric hypertrophy. Severe global hypokinesis present. There is severely reduced systolic function with a visually estimated ejection fraction of 15 - 20%. Grade III diastolic dysfunction.    Right Ventricle: The right ventricle has mild enlargement. Systolic function is moderately reduced.    Left Atrium: Severely dilated    Right Atrium: Right atrium is dilated.    Mitral Valve: There is moderate to severe regurgitation.    Tricuspid Valve: There is severe regurgitation.    Pulmonary Artery: There is pulmonary hypertension. The estimated pulmonary artery systolic pressure is 59 mmHg.    IVC/SVC: Elevated venous pressure at 15 mmHg.     Echo 2/15/24    Left Ventricle: The left ventricle is normal in size. Mildly increased wall thickness. There is concentric hypertrophy. Regional wall motion abnormalities present. There is mildly reduced systolic function with a visually estimated ejection fraction of 40 - 45%.  Anteroseptal hypokinesis.    Right Ventricle: Normal right ventricular cavity size. Systolic function is normal.    Left Atrium: Left atrium is severely dilated.    Mitral Valve: There is mild to moderate regurgitation.    Tricuspid Valve: There is mild regurgitation.    Pulmonary Artery: The estimated pulmonary artery systolic pressure is 44 mmHg.    IVC/SVC: Elevated venous pressure at 15 mmHg.     4/14/25 Reports fatigue and ALLEN for several  months. Denies CP  EKG NSR LVH with repol  Stop norvasc  Start entresto 24-26 bid  Echo and lexiscan myoview for combined CHF  BNP, BMP  Continue Rx for HTN, combined CHF     Labs 5/7/25  K 3.5  Cr 0.9  BNP 2697 up from 781     5/7/25 ALLEN improving. Denies CP  BP controlled  Discussed drop in EF 15-20%. Recommend R/LHC to r/o CAD as cause for worsening EF and CHF  Increase entresto 49-51 bid   Continue Rx for HTN, combined CHF     5/30/25 Did well after R/LHC. ALLEN improved. No LE edema. Denies CP  BP mostly controlled by outside readings    Review of Systems   Constitutional: Negative for decreased appetite.   HENT:  Negative for ear discharge.    Eyes:  Negative for blurred vision.   Respiratory:  Negative for hemoptysis.    Endocrine: Negative for polyphagia.   Hematologic/Lymphatic: Negative for adenopathy.   Skin:  Negative for color change.   Musculoskeletal:  Negative for joint swelling.   Genitourinary:  Negative for bladder incontinence.   Neurological:  Negative for brief paralysis.   Psychiatric/Behavioral:  Negative for hallucinations.    Allergic/Immunologic: Negative for hives.          Objective     Physical Exam  Constitutional:       Appearance: She is well-developed.   HENT:      Head: Normocephalic and atraumatic.   Eyes:      Conjunctiva/sclera: Conjunctivae normal.      Pupils: Pupils are equal, round, and reactive to light.   Cardiovascular:      Rate and Rhythm: Normal rate.      Pulses: Intact distal pulses.      Heart sounds: Normal heart sounds.   Pulmonary:      Effort: Pulmonary effort is normal.      Breath sounds: Normal breath sounds.   Abdominal:      General: Bowel sounds are normal.      Palpations: Abdomen is soft.   Musculoskeletal:         General: Normal range of motion.      Cervical back: Normal range of motion and neck supple.   Skin:     General: Skin is warm and dry.   Neurological:      Mental Status: She is alert and oriented to person, place, and time.             Assessment and Plan     1. Acute on chronic systolic congestive heart failure    2. Essential hypertension, benign    3. Hyperlipidemia, unspecified hyperlipidemia type    4. ALLEN (dyspnea on exertion)    5. Acute on chronic diastolic congestive heart failure        Plan:    CM and CHF out of proportion to CAD   Add jardiance 10 qd  Increase coreg 6.25 bid  OV 1 month with BNP BMP  Repeat echo 2 months - will discuss AICD if EF < 35%    Advance Care Planning     Date: 05/30/2025  Patient did not wish or was not able to name a surrogate decision maker or provide an Advance Care Plan.

## 2025-06-09 ENCOUNTER — PATIENT MESSAGE (OUTPATIENT)
Dept: ADMINISTRATIVE | Facility: HOSPITAL | Age: 70
End: 2025-06-09
Payer: MEDICARE

## 2025-06-17 ENCOUNTER — LAB VISIT (OUTPATIENT)
Dept: LAB | Facility: HOSPITAL | Age: 70
End: 2025-06-17
Attending: INTERNAL MEDICINE
Payer: MEDICARE

## 2025-06-17 DIAGNOSIS — E78.5 HYPERLIPIDEMIA, UNSPECIFIED HYPERLIPIDEMIA TYPE: Chronic | ICD-10-CM

## 2025-06-17 DIAGNOSIS — I50.33 ACUTE ON CHRONIC DIASTOLIC CONGESTIVE HEART FAILURE: ICD-10-CM

## 2025-06-17 DIAGNOSIS — I10 ESSENTIAL HYPERTENSION, BENIGN: Chronic | ICD-10-CM

## 2025-06-17 DIAGNOSIS — I50.23 ACUTE ON CHRONIC SYSTOLIC CONGESTIVE HEART FAILURE: ICD-10-CM

## 2025-06-17 DIAGNOSIS — R06.09 DOE (DYSPNEA ON EXERTION): ICD-10-CM

## 2025-06-17 LAB
ANION GAP (OHS): 11 MMOL/L (ref 8–16)
BNP SERPL-MCNC: 2558 PG/ML (ref 0–99)
BUN SERPL-MCNC: 9 MG/DL (ref 8–23)
CALCIUM SERPL-MCNC: 9.1 MG/DL (ref 8.7–10.5)
CHLORIDE SERPL-SCNC: 103 MMOL/L (ref 95–110)
CO2 SERPL-SCNC: 29 MMOL/L (ref 23–29)
CREAT SERPL-MCNC: 0.8 MG/DL (ref 0.5–1.4)
GFR SERPLBLD CREATININE-BSD FMLA CKD-EPI: >60 ML/MIN/1.73/M2
GLUCOSE SERPL-MCNC: 109 MG/DL (ref 70–110)
POTASSIUM SERPL-SCNC: 3.6 MMOL/L (ref 3.5–5.1)
SODIUM SERPL-SCNC: 143 MMOL/L (ref 136–145)

## 2025-06-17 PROCEDURE — 80048 BASIC METABOLIC PNL TOTAL CA: CPT | Mod: HCNC

## 2025-06-17 PROCEDURE — 83880 ASSAY OF NATRIURETIC PEPTIDE: CPT | Mod: HCNC

## 2025-06-17 PROCEDURE — 36415 COLL VENOUS BLD VENIPUNCTURE: CPT | Mod: HCNC

## 2025-06-25 ENCOUNTER — OFFICE VISIT (OUTPATIENT)
Dept: CARDIOLOGY | Facility: CLINIC | Age: 70
End: 2025-06-25
Payer: MEDICARE

## 2025-06-25 VITALS
DIASTOLIC BLOOD PRESSURE: 94 MMHG | WEIGHT: 137.44 LBS | SYSTOLIC BLOOD PRESSURE: 134 MMHG | HEIGHT: 66 IN | RESPIRATION RATE: 15 BRPM | HEART RATE: 85 BPM | OXYGEN SATURATION: 98 % | BODY MASS INDEX: 22.09 KG/M2

## 2025-06-25 DIAGNOSIS — I50.23 ACUTE ON CHRONIC SYSTOLIC CONGESTIVE HEART FAILURE: Primary | ICD-10-CM

## 2025-06-25 DIAGNOSIS — R06.09 DOE (DYSPNEA ON EXERTION): ICD-10-CM

## 2025-06-25 DIAGNOSIS — E78.5 HYPERLIPIDEMIA, UNSPECIFIED HYPERLIPIDEMIA TYPE: Chronic | ICD-10-CM

## 2025-06-25 DIAGNOSIS — I10 ESSENTIAL HYPERTENSION, BENIGN: Chronic | ICD-10-CM

## 2025-06-25 PROCEDURE — 3080F DIAST BP >= 90 MM HG: CPT | Mod: CPTII,HCNC,S$GLB, | Performed by: INTERNAL MEDICINE

## 2025-06-25 PROCEDURE — 1159F MED LIST DOCD IN RCRD: CPT | Mod: CPTII,HCNC,S$GLB, | Performed by: INTERNAL MEDICINE

## 2025-06-25 PROCEDURE — 3075F SYST BP GE 130 - 139MM HG: CPT | Mod: CPTII,HCNC,S$GLB, | Performed by: INTERNAL MEDICINE

## 2025-06-25 PROCEDURE — 99214 OFFICE O/P EST MOD 30 MIN: CPT | Mod: HCNC,S$GLB,, | Performed by: INTERNAL MEDICINE

## 2025-06-25 PROCEDURE — 4010F ACE/ARB THERAPY RXD/TAKEN: CPT | Mod: CPTII,HCNC,S$GLB, | Performed by: INTERNAL MEDICINE

## 2025-06-25 PROCEDURE — 99999 PR PBB SHADOW E&M-EST. PATIENT-LVL IV: CPT | Mod: PBBFAC,HCNC,, | Performed by: INTERNAL MEDICINE

## 2025-06-25 PROCEDURE — 1101F PT FALLS ASSESS-DOCD LE1/YR: CPT | Mod: CPTII,HCNC,S$GLB, | Performed by: INTERNAL MEDICINE

## 2025-06-25 PROCEDURE — 1126F AMNT PAIN NOTED NONE PRSNT: CPT | Mod: CPTII,HCNC,S$GLB, | Performed by: INTERNAL MEDICINE

## 2025-06-25 PROCEDURE — 3288F FALL RISK ASSESSMENT DOCD: CPT | Mod: CPTII,HCNC,S$GLB, | Performed by: INTERNAL MEDICINE

## 2025-06-25 PROCEDURE — 3008F BODY MASS INDEX DOCD: CPT | Mod: CPTII,HCNC,S$GLB, | Performed by: INTERNAL MEDICINE

## 2025-06-25 PROCEDURE — 1124F ACP DISCUSS-NO DSCNMKR DOCD: CPT | Mod: CPTII,HCNC,S$GLB, | Performed by: INTERNAL MEDICINE

## 2025-06-25 RX ORDER — SPIRONOLACTONE 50 MG/1
50 TABLET, FILM COATED ORAL DAILY
Qty: 90 TABLET | Refills: 3 | Status: SHIPPED | OUTPATIENT
Start: 2025-06-25 | End: 2026-06-25

## 2025-06-25 NOTE — PROGRESS NOTES
Subjective   Patient ID:  Greer Valentin is a 69 y.o. female who presents for follow-up of No chief complaint on file.      HPI      Systolic CHF, CAD, HTN     Admitted 2/14/24    Greer Valentin 68 y.o. female with HTN and tobacco abuse presents to the hospital chief complaint of shortness a breath.  She reports 2-3 weeks of intermittent shortness breath that worsens with lying flat and improves with sitting upright.  She was attempted no treatments home.  She she denies any history of heart failure.  She does admit she was not been taking her home amlodipine as prescribed for hypertension.  She denies any other daily home medications.  She denies fever chest pain nausea vomiting abdominal pain leg swelling melena hematuria hematemesis dizziness and syncope.     In the ED, afebrile without leukocytosis initial blood pressure 186/100 chest x-ray with small bilateral pleural effusions and interstitial findings may reflect pulmonary edema .        Hospital Course:   She was given IV Lasix. Her echo revealed mild systolic CHF and pulmonary hypertension. She felt significantly better with diuresis. She remained HDS and was cleared for discharge.         5/16/25 R/LHC - EDP 37, PCWP 38, PA 46/27 (mean 36), RV 56/30, RA 20, CO 4.4. LM distal 30%. LAD proximal 70% - moderate diffuse mid to distal disease 90% near apex, RI - large - luminal irregularities, Cx small -ok, RCA mild diffuse disease.     CM out of proportion to CAD  Reviewed with Dr Urrutia - initial medical Rx for LAD disease given the diffuse distal disease  IV lasix given in cath lab for elevated EDP and PCWP        Stress test 5/5/25    Abnormal myocardial perfusion scan.    Transient ischemic dilatation (TID) noted on stress images, suggestive of  balanced ischemia from multivessel coronary artery disease    There is a mild to moderate intensity, small to medium sized, mostly fixed perfusion abnormality with some reversibilty in the basal to mid inferior  wall(s) in the typical distribution of the RCA territory.    There are no other significant perfusion abnormalities.    There is trivial to mild apical thinning on rest and stress images which is a normal variant.    The gated perfusion images showed an ejection fraction of 29% post stress.    The ECG portion of the study is negative for ischemia.    The patient reported no chest pain during the stress test.    There were no arrhythmias during stress.     Echo 5/5/25    Left Ventricle: The left ventricle is mildly dilated. Normal wall thickness. There is moderate eccentric hypertrophy. Severe global hypokinesis present. There is severely reduced systolic function with a visually estimated ejection fraction of 15 - 20%. Grade III diastolic dysfunction.    Right Ventricle: The right ventricle has mild enlargement. Systolic function is moderately reduced.    Left Atrium: Severely dilated    Right Atrium: Right atrium is dilated.    Mitral Valve: There is moderate to severe regurgitation.    Tricuspid Valve: There is severe regurgitation.    Pulmonary Artery: There is pulmonary hypertension. The estimated pulmonary artery systolic pressure is 59 mmHg.    IVC/SVC: Elevated venous pressure at 15 mmHg.     Echo 2/15/24    Left Ventricle: The left ventricle is normal in size. Mildly increased wall thickness. There is concentric hypertrophy. Regional wall motion abnormalities present. There is mildly reduced systolic function with a visually estimated ejection fraction of 40 - 45%.  Anteroseptal hypokinesis.    Right Ventricle: Normal right ventricular cavity size. Systolic function is normal.    Left Atrium: Left atrium is severely dilated.    Mitral Valve: There is mild to moderate regurgitation.    Tricuspid Valve: There is mild regurgitation.    Pulmonary Artery: The estimated pulmonary artery systolic pressure is 44 mmHg.    IVC/SVC: Elevated venous pressure at 15 mmHg.     4/14/25 Reports fatigue and ALLEN for several  months. Denies CP  EKG NSR LVH with repol  Stop norvasc  Start entresto 24-26 bid  Echo and lexiscan myoview for combined CHF  BNP, BMP  Continue Rx for HTN, combined CHF     Labs 5/7/25  K 3.5  Cr 0.9  BNP 2697 up from 781     5/7/25 ALLEN improving. Denies CP  BP controlled  Discussed drop in EF 15-20%. Recommend R/LHC to r/o CAD as cause for worsening EF and CHF  Increase entresto 49-51 bid   Continue Rx for HTN, combined CHF     5/30/25 Did well after R/LHC. ALLEN improved. No LE edema. Denies CP  BP mostly controlled by outside readings  CM and CHF out of proportion to CAD  Add jardiance 10 qd  Increase coreg 6.25 bid  OV 1 month with BNP BMP  Repeat echo 2 months - will discuss AICD if EF < 35%     Labs 6/17/25  K 3.6  Cr 0.8  BNP 2558    6/25/25 Overall feeling better - still gets ALLEN when long walks  BP controlled  Reports compliance with meds       Review of Systems   Constitutional: Negative for decreased appetite.   HENT:  Negative for ear discharge.    Eyes:  Negative for blurred vision.   Respiratory:  Negative for hemoptysis.    Endocrine: Negative for polyphagia.   Hematologic/Lymphatic: Negative for adenopathy.   Skin:  Negative for color change.   Musculoskeletal:  Negative for joint swelling.   Genitourinary:  Negative for bladder incontinence.   Neurological:  Negative for brief paralysis.   Psychiatric/Behavioral:  Negative for hallucinations.    Allergic/Immunologic: Negative for hives.          Objective     Physical Exam  Constitutional:       Appearance: She is well-developed.   HENT:      Head: Normocephalic and atraumatic.   Eyes:      Conjunctiva/sclera: Conjunctivae normal.      Pupils: Pupils are equal, round, and reactive to light.   Cardiovascular:      Rate and Rhythm: Normal rate.      Pulses: Intact distal pulses.      Heart sounds: Normal heart sounds.   Pulmonary:      Effort: Pulmonary effort is normal.      Breath sounds: Normal breath sounds.   Abdominal:      General: Bowel sounds  are normal.      Palpations: Abdomen is soft.   Musculoskeletal:         General: Normal range of motion.      Cervical back: Normal range of motion and neck supple.   Skin:     General: Skin is warm and dry.   Neurological:      Mental Status: She is alert and oriented to person, place, and time.            Assessment and Plan     1. Acute on chronic systolic congestive heart failure    2. Hyperlipidemia, unspecified hyperlipidemia type    3. Essential hypertension, benign    4. ALLEN (dyspnea on exertion)        Plan:     Add aldactone 50 qd for CHF  OV 6 weeks with BNP, BMP, echo - will discuss AICD if EF < 35%  Continue Rx for CHF, CAD, HTN, HLD    Advance Care Planning     Date: 06/25/2025  Patient did not wish or was not able to name a surrogate decision maker or provide an Advance Care Plan.

## 2025-07-23 ENCOUNTER — TELEPHONE (OUTPATIENT)
Dept: FAMILY MEDICINE | Facility: CLINIC | Age: 70
End: 2025-07-23
Payer: MEDICARE

## 2025-07-23 RX ORDER — FUROSEMIDE 40 MG/1
40 TABLET ORAL
Qty: 30 TABLET | Refills: 0 | Status: SHIPPED | OUTPATIENT
Start: 2025-07-23

## 2025-07-23 NOTE — TELEPHONE ENCOUNTER
No care due was identified.  [facility] Embedded Care Due Messages. Reference number: [id number].   7/22/2025 7:48:10 PM CDT

## 2025-07-23 NOTE — TELEPHONE ENCOUNTER
Left voice message for patient to return call to clinic. To scheduled annual medication refill appointment

## 2025-08-19 ENCOUNTER — PATIENT MESSAGE (OUTPATIENT)
Dept: ADMINISTRATIVE | Facility: HOSPITAL | Age: 70
End: 2025-08-19
Payer: MEDICARE

## 2025-08-20 RX ORDER — FUROSEMIDE 40 MG/1
40 TABLET ORAL
Qty: 30 TABLET | Refills: 0 | OUTPATIENT
Start: 2025-08-20

## 2025-09-05 ENCOUNTER — OFFICE VISIT (OUTPATIENT)
Dept: CARDIOLOGY | Facility: CLINIC | Age: 70
End: 2025-09-05
Payer: MEDICARE

## 2025-09-05 VITALS
RESPIRATION RATE: 15 BRPM | BODY MASS INDEX: 21.95 KG/M2 | OXYGEN SATURATION: 98 % | HEART RATE: 63 BPM | SYSTOLIC BLOOD PRESSURE: 146 MMHG | HEIGHT: 65 IN | WEIGHT: 131.75 LBS | DIASTOLIC BLOOD PRESSURE: 84 MMHG

## 2025-09-05 DIAGNOSIS — I50.33 ACUTE ON CHRONIC DIASTOLIC CONGESTIVE HEART FAILURE: ICD-10-CM

## 2025-09-05 DIAGNOSIS — I50.23 ACUTE ON CHRONIC SYSTOLIC CONGESTIVE HEART FAILURE: Primary | ICD-10-CM

## 2025-09-05 DIAGNOSIS — E78.5 HYPERLIPIDEMIA, UNSPECIFIED HYPERLIPIDEMIA TYPE: Chronic | ICD-10-CM

## 2025-09-05 DIAGNOSIS — R06.09 DOE (DYSPNEA ON EXERTION): ICD-10-CM

## 2025-09-05 DIAGNOSIS — I10 ESSENTIAL HYPERTENSION, BENIGN: Chronic | ICD-10-CM

## 2025-09-05 PROCEDURE — 99999 PR PBB SHADOW E&M-EST. PATIENT-LVL IV: CPT | Mod: PBBFAC,HCNC,, | Performed by: INTERNAL MEDICINE

## (undated) DEVICE — KIT SYR REUSABLE

## (undated) DEVICE — CATH DXTERITY NOTO 100CM 6FR

## (undated) DEVICE — SHEATH INTRO PINNACLE 6F 10CM

## (undated) DEVICE — KIT MANIFOLD LOW PRESS TUBING

## (undated) DEVICE — CATH SWAN GANZ 7FR 110CM

## (undated) DEVICE — SET CO-SET CLOSED INJ

## (undated) DEVICE — PACK CATH LAB

## (undated) DEVICE — ANGIOTOUCH KIT

## (undated) DEVICE — GUIDEWIRE SAFE T J TIP 145X2.5

## (undated) DEVICE — CATH ANGIO PRO FLO XT PGTL 6F

## (undated) DEVICE — PAD DEFIB CADENCE ADULT R2

## (undated) DEVICE — CATH DXTERITY JL40 100CM 6FR

## (undated) DEVICE — SHEATH INTRO PINNACLE 7F 10CM

## (undated) DEVICE — OMNIPAQUE CONTRAST 350MG/100ML